# Patient Record
Sex: MALE | Race: WHITE | NOT HISPANIC OR LATINO | Employment: FULL TIME | URBAN - METROPOLITAN AREA
[De-identification: names, ages, dates, MRNs, and addresses within clinical notes are randomized per-mention and may not be internally consistent; named-entity substitution may affect disease eponyms.]

---

## 2020-08-15 ENCOUNTER — TRANSCRIBE ORDERS (OUTPATIENT)
Dept: LAB | Facility: HOSPITAL | Age: 55
End: 2020-08-15

## 2020-08-15 ENCOUNTER — APPOINTMENT (OUTPATIENT)
Dept: LAB | Facility: HOSPITAL | Age: 55
End: 2020-08-15
Payer: COMMERCIAL

## 2020-08-15 DIAGNOSIS — Z62.820 OTHER PARENT-CHILD PROBLEMS: ICD-10-CM

## 2020-08-15 DIAGNOSIS — E11.9 DIABETES MELLITUS WITHOUT COMPLICATION (HCC): ICD-10-CM

## 2020-08-15 DIAGNOSIS — I10 ESSENTIAL HYPERTENSION, MALIGNANT: ICD-10-CM

## 2020-08-15 DIAGNOSIS — E78.2 MIXED HYPERLIPIDEMIA: ICD-10-CM

## 2020-08-15 DIAGNOSIS — Z72.0 TOBACCO ABUSE: ICD-10-CM

## 2020-08-15 DIAGNOSIS — E66.9 LIFELONG OBESITY: ICD-10-CM

## 2020-08-15 DIAGNOSIS — Z71.89 OTHER PARENT-CHILD PROBLEMS: ICD-10-CM

## 2020-08-15 DIAGNOSIS — E66.9 LIFELONG OBESITY: Primary | ICD-10-CM

## 2020-08-15 LAB
ALBUMIN SERPL BCP-MCNC: 4.5 G/DL (ref 3.4–4.8)
ALP SERPL-CCNC: 55 U/L (ref 10–129)
ALT SERPL W P-5'-P-CCNC: 42 U/L (ref 5–63)
ANION GAP SERPL CALCULATED.3IONS-SCNC: 6 MMOL/L (ref 4–13)
AST SERPL W P-5'-P-CCNC: 20 U/L (ref 15–41)
BASOPHILS # BLD AUTO: 0.05 THOUSANDS/ΜL (ref 0–0.1)
BASOPHILS NFR BLD AUTO: 0 % (ref 0–1)
BILIRUB SERPL-MCNC: 0.51 MG/DL (ref 0.3–1.2)
BUN SERPL-MCNC: 14 MG/DL (ref 6–20)
CALCIUM SERPL-MCNC: 9.6 MG/DL (ref 8.4–10.2)
CHLORIDE SERPL-SCNC: 107 MMOL/L (ref 96–108)
CHOLEST SERPL-MCNC: 117 MG/DL
CO2 SERPL-SCNC: 27 MMOL/L (ref 22–33)
CREAT SERPL-MCNC: 0.94 MG/DL (ref 0.5–1.2)
EOSINOPHIL # BLD AUTO: 0.45 THOUSAND/ΜL (ref 0–0.61)
EOSINOPHIL NFR BLD AUTO: 4 % (ref 0–6)
ERYTHROCYTE [DISTWIDTH] IN BLOOD BY AUTOMATED COUNT: 12.9 % (ref 11.6–15.1)
EST. AVERAGE GLUCOSE BLD GHB EST-MCNC: 114 MG/DL
GFR SERPL CREATININE-BSD FRML MDRD: 92 ML/MIN/1.73SQ M
GLUCOSE P FAST SERPL-MCNC: 116 MG/DL (ref 70–100)
HBA1C MFR BLD: 5.6 %
HCT VFR BLD AUTO: 49.2 % (ref 36.5–49.3)
HDLC SERPL-MCNC: 30 MG/DL
HGB BLD-MCNC: 16.4 G/DL (ref 12–17)
IMM GRANULOCYTES # BLD AUTO: 0.04 THOUSAND/UL (ref 0–0.2)
IMM GRANULOCYTES NFR BLD AUTO: 0 % (ref 0–2)
LDLC SERPL CALC-MCNC: 68 MG/DL (ref 0–100)
LYMPHOCYTES # BLD AUTO: 2.64 THOUSANDS/ΜL (ref 0.6–4.47)
LYMPHOCYTES NFR BLD AUTO: 23 % (ref 14–44)
MCH RBC QN AUTO: 32.2 PG (ref 26.8–34.3)
MCHC RBC AUTO-ENTMCNC: 33.3 G/DL (ref 31.4–37.4)
MCV RBC AUTO: 97 FL (ref 82–98)
MONOCYTES # BLD AUTO: 0.61 THOUSAND/ΜL (ref 0.17–1.22)
MONOCYTES NFR BLD AUTO: 5 % (ref 4–12)
NEUTROPHILS # BLD AUTO: 7.83 THOUSANDS/ΜL (ref 1.85–7.62)
NEUTS SEG NFR BLD AUTO: 68 % (ref 43–75)
NONHDLC SERPL-MCNC: 87 MG/DL
PLATELET # BLD AUTO: 222 THOUSANDS/UL (ref 149–390)
PMV BLD AUTO: 10.6 FL (ref 8.9–12.7)
POTASSIUM SERPL-SCNC: 4.7 MMOL/L (ref 3.5–5)
PROT SERPL-MCNC: 6.9 G/DL (ref 6.4–8.3)
PSA SERPL-MCNC: 0.5 NG/ML (ref 0–4)
RBC # BLD AUTO: 5.1 MILLION/UL (ref 3.88–5.62)
SODIUM SERPL-SCNC: 140 MMOL/L (ref 133–145)
TRIGL SERPL-MCNC: 94.7 MG/DL
WBC # BLD AUTO: 11.62 THOUSAND/UL (ref 4.31–10.16)

## 2020-08-15 PROCEDURE — 36415 COLL VENOUS BLD VENIPUNCTURE: CPT

## 2020-08-15 PROCEDURE — G0103 PSA SCREENING: HCPCS

## 2020-08-15 PROCEDURE — 85025 COMPLETE CBC W/AUTO DIFF WBC: CPT

## 2020-08-15 PROCEDURE — 83036 HEMOGLOBIN GLYCOSYLATED A1C: CPT

## 2020-08-15 PROCEDURE — 80061 LIPID PANEL: CPT

## 2020-08-15 PROCEDURE — 80053 COMPREHEN METABOLIC PANEL: CPT

## 2020-08-17 ENCOUNTER — APPOINTMENT (OUTPATIENT)
Dept: LAB | Facility: HOSPITAL | Age: 55
End: 2020-08-17
Payer: COMMERCIAL

## 2020-08-17 ENCOUNTER — TRANSCRIBE ORDERS (OUTPATIENT)
Dept: ADMINISTRATIVE | Facility: HOSPITAL | Age: 55
End: 2020-08-17

## 2020-08-17 DIAGNOSIS — E11.9 DIABETES MELLITUS WITHOUT COMPLICATION (HCC): Primary | ICD-10-CM

## 2020-08-17 LAB
CREAT UR-MCNC: 267 MG/DL
MICROALBUMIN UR-MCNC: 10.6 MG/L (ref 0–20)
MICROALBUMIN/CREAT 24H UR: 4 MG/G CREATININE (ref 0–30)

## 2020-08-17 PROCEDURE — 82570 ASSAY OF URINE CREATININE: CPT

## 2020-08-17 PROCEDURE — 82043 UR ALBUMIN QUANTITATIVE: CPT

## 2021-01-10 ENCOUNTER — IMMUNIZATIONS (OUTPATIENT)
Dept: FAMILY MEDICINE CLINIC | Facility: HOSPITAL | Age: 56
End: 2021-01-10

## 2021-01-10 DIAGNOSIS — Z23 ENCOUNTER FOR IMMUNIZATION: ICD-10-CM

## 2021-01-10 PROCEDURE — 0001A SARS-COV-2 / COVID-19 MRNA VACCINE (PFIZER-BIONTECH) 30 MCG: CPT

## 2021-01-10 PROCEDURE — 91300 SARS-COV-2 / COVID-19 MRNA VACCINE (PFIZER-BIONTECH) 30 MCG: CPT

## 2021-01-25 ENCOUNTER — NURSE TRIAGE (OUTPATIENT)
Dept: OTHER | Facility: OTHER | Age: 56
End: 2021-01-25

## 2021-01-25 DIAGNOSIS — Z20.822 SUSPECTED SEVERE ACUTE RESPIRATORY SYNDROME CORONAVIRUS 2 (SARS-COV-2) INFECTION: ICD-10-CM

## 2021-01-25 DIAGNOSIS — Z20.822 SUSPECTED SEVERE ACUTE RESPIRATORY SYNDROME CORONAVIRUS 2 (SARS-COV-2) INFECTION: Primary | ICD-10-CM

## 2021-01-25 LAB — SARS-COV-2 RNA RESP QL NAA+PROBE: NEGATIVE

## 2021-01-25 PROCEDURE — U0003 INFECTIOUS AGENT DETECTION BY NUCLEIC ACID (DNA OR RNA); SEVERE ACUTE RESPIRATORY SYNDROME CORONAVIRUS 2 (SARS-COV-2) (CORONAVIRUS DISEASE [COVID-19]), AMPLIFIED PROBE TECHNIQUE, MAKING USE OF HIGH THROUGHPUT TECHNOLOGIES AS DESCRIBED BY CMS-2020-01-R: HCPCS | Performed by: FAMILY MEDICINE

## 2021-01-25 PROCEDURE — U0005 INFEC AGEN DETEC AMPLI PROBE: HCPCS | Performed by: FAMILY MEDICINE

## 2021-01-25 NOTE — TELEPHONE ENCOUNTER
Reason for Disposition   [1] COVID-19 infection suspected by caller or triager AND [2] mild symptoms (cough, fever, or others) AND [8] no complications or SOB    Protocols used: CORONAVIRUS (COVID-19) DIAGNOSED OR SUSPECTED-[facility]

## 2021-01-25 NOTE — TELEPHONE ENCOUNTER
1  Were you within 6 feet or less, for up to 15 minutes or more with a person that has a confirmed COVID-19 test?   Denied known exposure  He is a   2  What was the date of your exposure? N/A  3  Are you experiencing any symptoms attributed to the virus?  (Assess for SOB, cough, fever, difficulty breathing)  1/23/2021  Sore throat  Headache  Sinus congestion  Intermittent, productive cough  Sputum is white to yellow tinged  4  HIGH RISK: Do you have any history heart or lung conditions, weakened immune system, diabetes, Asthma, CHF, HIV, COPD, Chemo, renal failure, sickle cell, etc?   Type II Diabetes  Hypertension

## 2021-01-25 NOTE — TELEPHONE ENCOUNTER
Regarding: COVID- Symptomatic/ Cough  ----- Message from Dominique Mishra Dr sent at 1/25/2021  7:26 AM EST -----  "I would like a script to get tested for COVID-19   I have a cough and congestion "

## 2021-01-29 ENCOUNTER — TRANSCRIBE ORDERS (OUTPATIENT)
Dept: ADMINISTRATIVE | Facility: HOSPITAL | Age: 56
End: 2021-01-29

## 2021-01-29 ENCOUNTER — HOSPITAL ENCOUNTER (OUTPATIENT)
Dept: RADIOLOGY | Facility: HOSPITAL | Age: 56
Discharge: HOME | End: 2021-01-29
Attending: INTERNAL MEDICINE
Payer: COMMERCIAL

## 2021-01-29 DIAGNOSIS — J45.901 EXTRINSIC ASTHMA WITH ACUTE EXACERBATION, UNSPECIFIED ASTHMA SEVERITY, UNSPECIFIED WHETHER PERSISTENT: Primary | ICD-10-CM

## 2021-01-29 DIAGNOSIS — J45.901 EXTRINSIC ASTHMA WITH ACUTE EXACERBATION, UNSPECIFIED ASTHMA SEVERITY, UNSPECIFIED WHETHER PERSISTENT: ICD-10-CM

## 2021-01-29 PROCEDURE — 71046 X-RAY EXAM CHEST 2 VIEWS: CPT

## 2021-01-30 ENCOUNTER — IMMUNIZATIONS (OUTPATIENT)
Dept: FAMILY MEDICINE CLINIC | Facility: HOSPITAL | Age: 56
End: 2021-01-30

## 2021-01-30 DIAGNOSIS — Z23 ENCOUNTER FOR IMMUNIZATION: Primary | ICD-10-CM

## 2021-01-30 PROCEDURE — 91300 SARS-COV-2 / COVID-19 MRNA VACCINE (PFIZER-BIONTECH) 30 MCG: CPT

## 2021-01-30 PROCEDURE — 0002A SARS-COV-2 / COVID-19 MRNA VACCINE (PFIZER-BIONTECH) 30 MCG: CPT

## 2021-02-07 DIAGNOSIS — K21.00 GASTROESOPHAGEAL REFLUX DISEASE WITH ESOPHAGITIS WITHOUT HEMORRHAGE: Primary | ICD-10-CM

## 2021-02-08 RX ORDER — PANTOPRAZOLE SODIUM 20 MG/1
TABLET, DELAYED RELEASE ORAL
Qty: 30 TABLET | Refills: 1 | Status: SHIPPED | OUTPATIENT
Start: 2021-02-08 | End: 2021-02-15 | Stop reason: SDUPTHER

## 2021-02-15 DIAGNOSIS — K21.00 GASTROESOPHAGEAL REFLUX DISEASE WITH ESOPHAGITIS WITHOUT HEMORRHAGE: ICD-10-CM

## 2021-02-15 RX ORDER — PANTOPRAZOLE SODIUM 20 MG/1
TABLET, DELAYED RELEASE ORAL
Qty: 60 TABLET | Refills: 3 | Status: SHIPPED | OUTPATIENT
Start: 2021-02-15 | End: 2021-02-25

## 2021-02-18 NOTE — TELEPHONE ENCOUNTER
PT CALLED, INFORMED BY HIS [facility], PANTOPRAZOLE 20MG  BID NEEDS PRE-AUTHORIZATION   THANKS

## 2021-02-22 ENCOUNTER — TELEPHONE (OUTPATIENT)
Dept: GASTROENTEROLOGY | Facility: CLINIC | Age: 56
End: 2021-02-22

## 2021-02-25 DIAGNOSIS — K21.00 GASTROESOPHAGEAL REFLUX DISEASE WITH ESOPHAGITIS WITHOUT HEMORRHAGE: ICD-10-CM

## 2021-02-25 RX ORDER — PANTOPRAZOLE SODIUM 20 MG/1
TABLET, DELAYED RELEASE ORAL
Qty: 60 TABLET | Refills: 2 | Status: SHIPPED | OUTPATIENT
Start: 2021-02-25 | End: 2021-05-24

## 2021-03-10 ENCOUNTER — CONSULT (OUTPATIENT)
Dept: PULMONOLOGY | Facility: CLINIC | Age: 56
End: 2021-03-10
Payer: COMMERCIAL

## 2021-03-10 VITALS
SYSTOLIC BLOOD PRESSURE: 110 MMHG | BODY MASS INDEX: 38.36 KG/M2 | HEIGHT: 76 IN | HEART RATE: 104 BPM | TEMPERATURE: 98.1 F | WEIGHT: 315 LBS | DIASTOLIC BLOOD PRESSURE: 70 MMHG | RESPIRATION RATE: 18 BRPM

## 2021-03-10 DIAGNOSIS — Z87.891 PERSONAL HISTORY OF TOBACCO USE: ICD-10-CM

## 2021-03-10 DIAGNOSIS — J41.0 SIMPLE CHRONIC BRONCHITIS (HCC): ICD-10-CM

## 2021-03-10 DIAGNOSIS — J20.9 ACUTE BRONCHITIS: Primary | ICD-10-CM

## 2021-03-10 PROCEDURE — 99244 OFF/OP CNSLTJ NEW/EST MOD 40: CPT | Performed by: INTERNAL MEDICINE

## 2021-03-10 PROCEDURE — 3008F BODY MASS INDEX DOCD: CPT | Performed by: INTERNAL MEDICINE

## 2021-03-10 RX ORDER — ATORVASTATIN CALCIUM 40 MG/1
TABLET, FILM COATED ORAL
COMMUNITY
Start: 2020-10-05

## 2021-03-10 RX ORDER — VARENICLINE TARTRATE 25 MG
KIT ORAL
Qty: 53 TABLET | Refills: 0 | Status: SHIPPED | OUTPATIENT
Start: 2021-03-10 | End: 2021-06-11

## 2021-03-10 RX ORDER — LOSARTAN POTASSIUM 100 MG/1
100 TABLET ORAL DAILY
COMMUNITY
Start: 2021-02-28

## 2021-03-10 RX ORDER — NICOTINE 21 MG/24HR
1 PATCH, TRANSDERMAL 24 HOURS TRANSDERMAL EVERY 24 HOURS
Qty: 28 PATCH | Refills: 0 | Status: SHIPPED | OUTPATIENT
Start: 2021-03-10 | End: 2021-06-11

## 2021-03-10 RX ORDER — VARENICLINE TARTRATE 1 MG/1
1 TABLET, FILM COATED ORAL 2 TIMES DAILY
Qty: 60 TABLET | Refills: 2 | Status: SHIPPED | OUTPATIENT
Start: 2021-03-10 | End: 2021-06-11

## 2021-03-10 NOTE — ASSESSMENT & PLAN NOTE
Current smoker of 2 packs of cigarettes daily  Previously stop smoking for 8 months with the use of Chantix  Penis wife both desire to stop smoking  Based on his history of success with Chantix I reordered that drug along with nicotine patch which is now recommended by the [employer]  Follow-up in a month  Behavioral modification discussed

## 2021-03-10 NOTE — ASSESSMENT & PLAN NOTE
This patient has not in a baseline has chronic cough usually in the morning and the spine sputum production  Would be labeled as chronic bronchitis based on this history  For now there seems to be no convincing evidence of airflow obstruction the based on the symptoms and physical findings  PFT's planned  I suspect that the low lung volumes on chest x-ray was more related to his body weight than anything else

## 2021-03-10 NOTE — PROGRESS NOTES
Assessment/Plan:    Simple chronic bronchitis ([address] )  This patient has not in a baseline has chronic cough usually in the morning and the spine sputum production  Would be labeled as chronic bronchitis based on this history  For now there seems to be no convincing evidence of airflow obstruction the based on the symptoms and physical findings  PFT's planned  I suspect that the low lung volumes on chest x-ray was more related to his body weight than anything else  Personal history of tobacco use  Current smoker of 2 packs of cigarettes daily  Previously stop smoking for 8 months with the use of Chantix  Penis wife both desire to stop smoking  Based on his history of success with Chantix I reordered that drug along with nicotine patch which is now recommended by the [employer]  Follow-up in a month  Behavioral modification discussed  Correction of above  Third sentence above should read: He and his wife both desire to stop smoking  Sorry for the typo   Diagnoses and all orders for this visit:    Acute bronchitis  -     Complete PFT with post bronchodilator; Future    Personal history of tobacco use  -     varenicline (CHANTIX LIZETTE) 0 5 MG X 11 & 1 MG X 42 tablet; Take one 0 5 mg tablet by mouth once daily for 3 days, then one 0 5 mg tablet by mouth twice daily for 4 days, then one 1 mg tablet by mouth twice daily  -     varenicline (CHANTIX) 1 mg tablet; Take 1 tablet (1 mg total) by mouth 2 (two) times a day  -     nicotine (NICODERM CQ) 21 mg/24 hr TD 24 hr patch; Place 1 patch on the skin every 24 hours    Simple chronic bronchitis (HCC)    Other orders  -     atorvastatin (LIPITOR) 40 mg tablet  -     losartan (COZAAR) 100 MG tablet; Take 100 mg by mouth daily  -     metFORMIN (GLUCOPHAGE) 1000 MG tablet; Take 1,000 mg by mouth 2 (two) times a day  -     Semaglutide (OZEMPIC, 0 25 OR 0 5 MG/DOSE, SC)          Subjective:      Patient ID: Antionette Mays is a 50 y o  male      This patient recently had an episode of acute bronchitis  Reportedly he tested negative for COVID-19 at that time  He does recall being particularly short of breath  Did not have the taste or smell alteration  Not aware of fever  No GI symptoms  Now feels back to normal   There is question about underlying lung disease  Recent chest x-ray was clear except for low lung volumes probably reflecting his body size  Patient is a current smoker of 2 packs of cigarettes daily  He and his wife would like to stop smoking  Previously was able to stop smoking using Chantix for 8 months  Patient when well has a chronic cough especially in the morning productive of small amounts of white sputum  Not aware of significant dyspnea on exertion  His wife hears him wheeze at night  No nocturnal awakenings with the respiratory symptoms  No symptoms of sleep apnea  Patient works 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 work  The following portions of the patient's history were reviewed and updated as appropriate: allergies, current medications, past family history, past medical history, past social history, past surgical history and problem list     Review of Systems   Constitutional: Negative for activity change and unexpected weight change  HENT: Negative for congestion, sinus pressure and sneezing  Respiratory: Positive for cough and wheezing  Negative for apnea and shortness of breath  Cardiovascular: Negative for chest pain, palpitations and leg swelling  Gastrointestinal: Negative for abdominal pain  Genitourinary: Negative for difficulty urinating  Musculoskeletal: Negative for arthralgias and back pain  Allergic/Immunologic: Negative for environmental allergies  Neurological: Negative  Hematological: Negative for adenopathy           Objective:      /70 (BP Location: Left arm, Patient Position: Sitting, Cuff Size: Adult)   Pulse 104   Temp 98 1 °F (36 7 °C)   Resp 18   Ht 6' 4" (1 93 m)   Wt (!) 150 kg (330 lb)   BMI 40 17 kg/m²          Physical Exam  Vitals signs reviewed  Constitutional:       Appearance: He is obese  He is not ill-appearing  HENT:      Right Ear: Tympanic membrane normal       Left Ear: Tympanic membrane normal       Nose: Septal deviation (To the right) present  Mouth/Throat:      Mouth: Mucous membranes are moist       Pharynx: Oropharynx is clear  Eyes:      General: No scleral icterus  Conjunctiva/sclera: Conjunctivae normal    Neck:      Musculoskeletal: No neck rigidity or muscular tenderness  Vascular: No JVD  Cardiovascular:      Rate and Rhythm: Normal rate and regular rhythm  Pulses:           Radial pulses are 3+ on the right side and 3+ on the left side  Heart sounds: Normal heart sounds  Pulmonary:      Effort: Pulmonary effort is normal       Breath sounds: Normal breath sounds  No wheezing or rales  Abdominal:      General: Abdomen is flat  There is no distension  Palpations: Abdomen is soft  There is no hepatomegaly or splenomegaly  Musculoskeletal:         General: No swelling  Right lower leg: No edema  Left lower leg: No edema  Lymphadenopathy:      Cervical: No cervical adenopathy  Skin:     General: Skin is warm and dry  Findings: Erythema ( mild post phlebitic rash the right ankle) present  Neurological:      Mental Status: He is alert and oriented to person, place, and time     Psychiatric:         Mood and Affect: Mood normal          Behavior: Behavior normal

## 2021-03-19 ENCOUNTER — TELEPHONE (OUTPATIENT)
Dept: PULMONOLOGY | Facility: CLINIC | Age: 56
End: 2021-03-19

## 2021-03-19 NOTE — TELEPHONE ENCOUNTER
Called patient and LM to call the office so we can help him schedule his PFT that was ordered on 3/10/21

## 2021-04-13 ENCOUNTER — HOSPITAL ENCOUNTER (OUTPATIENT)
Dept: PULMONOLOGY | Facility: HOSPITAL | Age: 56
Discharge: HOME | End: 2021-04-13
Attending: INTERNAL MEDICINE
Payer: COMMERCIAL

## 2021-04-13 DIAGNOSIS — J20.9 ACUTE BRONCHITIS: ICD-10-CM

## 2021-04-13 PROCEDURE — 94060 EVALUATION OF WHEEZING: CPT

## 2021-04-13 PROCEDURE — 94060 EVALUATION OF WHEEZING: CPT | Performed by: INTERNAL MEDICINE

## 2021-04-13 PROCEDURE — 94726 PLETHYSMOGRAPHY LUNG VOLUMES: CPT

## 2021-04-13 PROCEDURE — 94726 PLETHYSMOGRAPHY LUNG VOLUMES: CPT | Performed by: INTERNAL MEDICINE

## 2021-04-13 PROCEDURE — 94729 DIFFUSING CAPACITY: CPT | Performed by: INTERNAL MEDICINE

## 2021-04-13 PROCEDURE — 94760 N-INVAS EAR/PLS OXIMETRY 1: CPT

## 2021-04-13 PROCEDURE — 94200 LUNG FUNCTION TEST (MBC/MVV): CPT

## 2021-04-13 PROCEDURE — 94729 DIFFUSING CAPACITY: CPT

## 2021-04-13 RX ORDER — ALBUTEROL SULFATE 2.5 MG/3ML
2.5 SOLUTION RESPIRATORY (INHALATION) ONCE
Status: COMPLETED | OUTPATIENT
Start: 2021-04-13 | End: 2021-04-13

## 2021-04-13 RX ADMIN — ALBUTEROL SULFATE 2.5 MG: 2.5 SOLUTION RESPIRATORY (INHALATION) at 15:11

## 2021-05-04 ENCOUNTER — OFFICE VISIT (OUTPATIENT)
Dept: GASTROENTEROLOGY | Facility: CLINIC | Age: 56
End: 2021-05-04
Payer: COMMERCIAL

## 2021-05-04 VITALS
DIASTOLIC BLOOD PRESSURE: 85 MMHG | SYSTOLIC BLOOD PRESSURE: 138 MMHG | BODY MASS INDEX: 38.36 KG/M2 | HEIGHT: 76 IN | WEIGHT: 315 LBS | HEART RATE: 83 BPM

## 2021-05-04 DIAGNOSIS — K22.81 ESOPHAGEAL POLYP: ICD-10-CM

## 2021-05-04 DIAGNOSIS — E66.3 OVERWEIGHT: ICD-10-CM

## 2021-05-04 DIAGNOSIS — K22.70 BARRETT'S ESOPHAGUS WITHOUT DYSPLASIA: ICD-10-CM

## 2021-05-04 DIAGNOSIS — Z86.010 HX OF ADENOMATOUS COLONIC POLYPS: ICD-10-CM

## 2021-05-04 DIAGNOSIS — Q43.8 REDUNDANT COLON: ICD-10-CM

## 2021-05-04 DIAGNOSIS — K21.00 GASTROESOPHAGEAL REFLUX DISEASE WITH ESOPHAGITIS WITHOUT HEMORRHAGE: Primary | ICD-10-CM

## 2021-05-04 PROBLEM — Z86.0101 HX OF ADENOMATOUS COLONIC POLYPS: Status: ACTIVE | Noted: 2021-05-04

## 2021-05-04 PROCEDURE — 99214 OFFICE O/P EST MOD 30 MIN: CPT | Performed by: INTERNAL MEDICINE

## 2021-05-04 PROCEDURE — 3008F BODY MASS INDEX DOCD: CPT | Performed by: INTERNAL MEDICINE

## 2021-05-04 NOTE — PROGRESS NOTES
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    As above  - EGD; Future  - PAT Covid Screening; Future    4  Hx of adenomatous colonic polyps   colon cancer screening is up-to-date  Next colonoscopy December 2022     5  Redundant colon   adequately moving his bowels  6  Overweight    Discussed diet weight loss exercise  He is eating salads each day  He will otherwise follow-up with me in 6 months     ______________________________________________________________________    SUBJECTIVE:   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  He has Maciel's esophagus and reflux which are well controlled with just 20 mg of Protonix a day  When he runs out he has difficulties  He likewise has a history of a squamous papilloma of the esophagus and is due for surveillance  We discussed doing his Maciel's  Surveillance at the same time  He does have a history of a redundant colon and tubular adenomas of the colon is next colonoscopy is due 1222  He is working on portion control weight loss and exercise  REVIEW OF SYSTEMS IS OTHERWISE NEGATIVE  Historical Information   History reviewed  No pertinent past medical history  History reviewed  No pertinent surgical history    Social History   Social History     Substance and Sexual Activity   Alcohol Use Yes    Frequency: Monthly or less    Drinks per session: 1 or 2    Binge frequency: Never     Social History     Substance and Sexual Activity   Drug Use Never     Social History     Tobacco Use   Smoking Status Current Every Day Smoker    Packs/day: 2 00    Years: 36 00    Pack years: 72 00    Types: Cigarettes    Start date: 3/10/1984   Smokeless Tobacco Never Used     History reviewed  No pertinent family history  Meds/Allergies       Current Outpatient Medications:     atorvastatin (LIPITOR) 40 mg tablet    losartan (COZAAR) 100 MG tablet    pantoprazole (PROTONIX) 20 mg tablet    Semaglutide (OZEMPIC, 0 25 OR 0 5 MG/DOSE, SC)    metFORMIN (GLUCOPHAGE) 1000 MG tablet    nicotine (NICODERM CQ) 21 mg/24 hr TD 24 hr patch    varenicline (CHANTIX LIZETTE) 0 5 MG X 11 & 1 MG X 42 tablet    varenicline (CHANTIX) 1 mg tablet    No Known Allergies        Objective     Blood pressure 138/85, pulse 83, height 6' 4" (1 93 m), weight (!) 147 kg (324 lb)  Body mass index is 39 44 kg/m²  PHYSICAL EXAM:      General Appearance:   Alert, cooperative, no distress   HEENT:   Normocephalic, atraumatic, anicteric      Neck:  Supple, symmetrical, trachea midline   Lungs:   Clear to auscultation bilaterally; no rales, rhonchi or wheezing; respirations unlabored    Heart[de-identified]   Regular rate and rhythm; no murmur, rub, or gallop  Abdomen:   Soft, non-tender, non-distended; normal bowel sounds; no masses, no organomegaly    Genitalia:   Deferred    Rectal:   Deferred    Extremities:  No cyanosis, clubbing or edema    Pulses:  2+ and symmetric    Skin:  No jaundice, rashes, or lesions    Lymph nodes:  No palpable cervical lymphadenopathy        Lab Results:   No visits with results within 1 Day(s) from this visit  Latest known visit with results is:   Orders Only on 01/25/2021   Component Date Value    SARS-CoV-2 01/25/2021 Negative          Radiology Results:   No results found

## 2021-05-22 DIAGNOSIS — K21.00 GASTROESOPHAGEAL REFLUX DISEASE WITH ESOPHAGITIS WITHOUT HEMORRHAGE: ICD-10-CM

## 2021-05-24 RX ORDER — PANTOPRAZOLE SODIUM 20 MG/1
TABLET, DELAYED RELEASE ORAL
Qty: 60 TABLET | Refills: 2 | Status: SHIPPED | OUTPATIENT
Start: 2021-05-24 | End: 2021-08-26

## 2021-06-07 ENCOUNTER — TELEPHONE (OUTPATIENT)
Dept: GASTROENTEROLOGY | Facility: CLINIC | Age: 56
End: 2021-06-07

## 2021-06-09 LAB — EXT SARS-COV-2: NOT DETECTED

## 2021-06-10 RX ORDER — SEMAGLUTIDE 1.34 MG/ML
INJECTION, SOLUTION SUBCUTANEOUS
COMMUNITY
Start: 2021-05-19

## 2021-06-10 RX ORDER — FLUTICASONE FUROATE AND VILANTEROL 200; 25 UG/1; UG/1
POWDER RESPIRATORY (INHALATION)
COMMUNITY
Start: 2021-05-17

## 2021-06-10 RX ORDER — HYDROCHLOROTHIAZIDE 25 MG/1
25 TABLET ORAL DAILY
COMMUNITY
Start: 2021-04-24

## 2021-06-10 RX ORDER — MULTIVIT-MIN/IRON FUM/FOLIC AC 7.5 MG-4
1 TABLET ORAL DAILY
COMMUNITY

## 2021-06-11 ENCOUNTER — ANESTHESIA EVENT (OUTPATIENT)
Dept: GASTROENTEROLOGY | Facility: AMBULATORY SURGERY CENTER | Age: 56
End: 2021-06-11

## 2021-06-11 ENCOUNTER — ANESTHESIA (OUTPATIENT)
Dept: GASTROENTEROLOGY | Facility: AMBULATORY SURGERY CENTER | Age: 56
End: 2021-06-11

## 2021-06-11 ENCOUNTER — HOSPITAL ENCOUNTER (OUTPATIENT)
Dept: GASTROENTEROLOGY | Facility: AMBULATORY SURGERY CENTER | Age: 56
Discharge: HOME | End: 2021-06-11
Payer: COMMERCIAL

## 2021-06-11 VITALS
OXYGEN SATURATION: 96 % | TEMPERATURE: 96.6 F | DIASTOLIC BLOOD PRESSURE: 75 MMHG | HEART RATE: 71 BPM | WEIGHT: 315 LBS | HEIGHT: 76 IN | RESPIRATION RATE: 18 BRPM | SYSTOLIC BLOOD PRESSURE: 129 MMHG | BODY MASS INDEX: 38.36 KG/M2

## 2021-06-11 DIAGNOSIS — K22.81 ESOPHAGEAL POLYP: ICD-10-CM

## 2021-06-11 DIAGNOSIS — K22.70 BARRETT'S ESOPHAGUS WITHOUT DYSPLASIA: ICD-10-CM

## 2021-06-11 DIAGNOSIS — K20.90 ESOPHAGITIS: Primary | ICD-10-CM

## 2021-06-11 DIAGNOSIS — K21.00 GASTROESOPHAGEAL REFLUX DISEASE WITH ESOPHAGITIS WITHOUT HEMORRHAGE: ICD-10-CM

## 2021-06-11 PROCEDURE — 88305 TISSUE EXAM BY PATHOLOGIST: CPT | Performed by: PATHOLOGY

## 2021-06-11 PROCEDURE — 00731 ANES UPR GI NDSC PX NOS: CPT | Performed by: NURSE ANESTHETIST, CERTIFIED REGISTERED

## 2021-06-11 PROCEDURE — 43239 EGD BIOPSY SINGLE/MULTIPLE: CPT | Performed by: INTERNAL MEDICINE

## 2021-06-11 RX ORDER — PROPOFOL 10 MG/ML
INJECTION, EMULSION INTRAVENOUS AS NEEDED
Status: DISCONTINUED | OUTPATIENT
Start: 2021-06-11 | End: 2021-06-11

## 2021-06-11 RX ORDER — SODIUM CHLORIDE 9 MG/ML
20 INJECTION, SOLUTION INTRAVENOUS CONTINUOUS
Status: DISCONTINUED | OUTPATIENT
Start: 2021-06-11 | End: 2021-06-15 | Stop reason: HOSPADM

## 2021-06-11 RX ORDER — SODIUM CHLORIDE 9 MG/ML
30 INJECTION, SOLUTION INTRAVENOUS CONTINUOUS
Status: DISCONTINUED | OUTPATIENT
Start: 2021-06-11 | End: 2021-06-15 | Stop reason: HOSPADM

## 2021-06-11 RX ADMIN — PROPOFOL 100 MG: 10 INJECTION, EMULSION INTRAVENOUS at 09:25

## 2021-06-11 RX ADMIN — PROPOFOL 100 MG: 10 INJECTION, EMULSION INTRAVENOUS at 09:27

## 2021-06-11 RX ADMIN — PROPOFOL 100 MG: 10 INJECTION, EMULSION INTRAVENOUS at 09:26

## 2021-06-11 RX ADMIN — SODIUM CHLORIDE: 9 INJECTION, SOLUTION INTRAVENOUS at 09:19

## 2021-06-11 RX ADMIN — PROPOFOL 100 MG: 10 INJECTION, EMULSION INTRAVENOUS at 09:30

## 2021-06-11 NOTE — DISCHARGE INSTRUCTIONS
Upper Endoscopy   WHAT YOU NEED TO KNOW:   An upper endoscopy is also called an upper gastrointestinal (GI) endoscopy, or an esophagogastroduodenoscopy (EGD)  It is a procedure to examine the inside of your esophagus, stomach, and duodenum (first part of the small intestine) with a scope  You may feel bloated, gassy, or have some abdominal discomfort after your procedure  Your throat may be sore for 24 to 36 hours  You may burp or pass gas from air that is still inside your body  DISCHARGE INSTRUCTIONS:   Seek care immediately if:    You have sudden, severe abdominal pain   You have problems swallowing   You have a large amount of black, sticky bowel movements or blood in your bowel movements   You have sudden trouble breathing   You feel weak, lightheaded, or faint or your heart beats faster than normal for you  Contact your healthcare provider if:    You have a fever and chills   You have nausea or are vomiting   Your abdomen is bloated or feels full and hard   You have abdominal pain   You have black, sticky bowel movements or blood in your bowel movements   You have not had a bowel movement for 3 days after your procedure   You have rash or hives   You have questions or concerns about your procedure  Activity:    Do not lift, strain, or run for 24 hours after your procedure   Rest after your procedure  You have been given medicine to relax you  Do not drive or make important decisions until the day after your procedure  Return to your normal activity as directed   Relieve gas and discomfort from bloating by lying on your right side with a heating pad on your abdomen  You may need to take short walks to help the gas move out  Eat small meals until bloating is relieved  Follow up with your healthcare provider as directed: Write down your questions so you remember to ask them during your visits       If you take a blood thinner, please review the specific instructions from your endoscopist about when you should resume it  These can be found in the Recommendation and Your Medication list sections of this After Visit Summary  Gastroesophageal Reflux Disease   WHAT YOU NEED TO KNOW:   What is gastroesophageal reflux disease (GERD)? GERD is reflux that occurs more than twice a week for a few weeks  Reflux means acid and food in the stomach back up into the esophagus  It usually causes heartburn and other symptoms  GERD can cause other health problems over time if it is not treated  What causes GERD? GERD often occurs when the lower muscle (sphincter) of the esophagus does not close properly  The sphincter normally opens to let food into the stomach  It then closes to keep food and stomach acid in the stomach  If the sphincter does not close properly, stomach acid and food back up (reflux) into the esophagus  The following may increase your risk for GERD:  · Certain foods such as spicy foods, chocolate, foods that contain caffeine, peppermint, and fried foods    · Hiatal hernia    · Certain medicines such as calcium channel blockers (used to treat high blood pressure), allergy medicines, sedatives, or antidepressants    · Pregnancy or obesity    · Lying down after a meal    · Drinking alcohol or smoking    What are the signs and symptoms of GERD? · Heartburn (burning pain in your chest)    · Pain after meals that spreads to your neck, jaw, or shoulder    · Pain that gets better when you change positions    · Bitter or acid taste in your mouth    · A dry cough    · Trouble swallowing or pain with swallowing    · Hoarseness or a sore throat    · Burping or hiccups    · Feeling full soon after you start eating    How is GERD diagnosed? Your healthcare provider will ask about your symptoms and when they started  Tell him or her about other medical conditions you have, your eating habits, and your activities   You may also need any of the following:  · The amount of acid  in your esophagus and stomach may be checked  A small probe is used to check the amount  · An endoscopy  is a procedure used to look at the inside of your esophagus and stomach  An endoscope is a bendable tube with a light and camera on the end  Your healthcare provider may remove a small sample of tissue and send it to a lab for tests  · X-ray  pictures may be taken of your stomach and intestines (bowel)  You may be given a chalky liquid to drink before the pictures are taken  This liquid helps your stomach and intestines show up better on the x-rays  · Pressure and function  tests of your esophagus can help find problems such as a hiatal hernia  How is GERD treated? · Medicines  are used to decrease stomach acid  Medicine may also be used to help your lower esophageal sphincter and stomach contract (tighten) more  · Surgery  is done to wrap the upper part of the stomach around the esophageal sphincter  This will strengthen the sphincter and prevent reflux  How can I manage GERD? · Do not have foods or drinks that may increase heartburn  These include chocolate, peppermint, fried or fatty foods, drinks that contain caffeine, or carbonated drinks (soda)  Other foods include spicy foods, onions, tomatoes, and tomato-based foods  Do not have foods or drinks that can irritate your esophagus, such as citrus fruits, juices, and alcohol  · Do not eat large meals  When you eat a lot of food at one time, your stomach needs more acid to digest it  Eat 6 small meals each day instead of 3 large ones, and eat slowly  Do not eat meals 2 to 3 hours before bedtime  · Elevate the head of your bed  Place 6-inch blocks under the head of your bed frame  You may also use more than one pillow under your head and shoulders while you sleep  · Maintain a healthy weight  If you are overweight, weight loss may help relieve symptoms of GERD  · Do not smoke    Smoking weakens the lower esophageal sphincter and increases the risk of GERD  Ask your healthcare provider for information if you currently smoke and need help to quit  E-cigarettes or smokeless tobacco still contain nicotine  Talk to your healthcare provider before you use these products  · Do not wear clothing that is tight around your waist   Tight clothing can put pressure on your stomach and cause or worsen GERD symptoms  Call your local emergency number (911 in the [address]) if:   · You have severe chest pain and sudden trouble breathing  When should I seek immediate care? · You have trouble breathing after you vomit  · You have trouble swallowing, or pain with swallowing  · Your bowel movements are black, bloody, or tarry-looking  · Your vomit looks like coffee grounds or has blood in it  When should I call my doctor? · You feel full and cannot burp or vomit  · You vomit large amounts, or you vomit often  · You are losing weight without trying  · Your symptoms get worse or do not improve with treatment  · You have questions or concerns about your condition or care  CARE AGREEMENT:   You have the right to help plan your care  Learn about your health condition and how it may be treated  Discuss treatment options with your healthcare providers to decide what care you want to receive  You always have the right to refuse treatment  The above information is an  only  It is not intended as medical advice for individual conditions or treatments  Talk to your doctor, nurse or pharmacist before following any medical regimen to see if it is safe and effective for you  © Copyright [address] Information is for End User's use only and may not be sold, redistributed or otherwise used for commercial purposes   All illustrations and images included in CareNotes® are the copyrighted property of A D A M [employer] Inc  or [employer]  Gastric Polyps   WHAT YOU NEED TO KNOW: What are gastric polyps? Gastric polyps are growths that form in the lining of your stomach  They are not cancerous, but certain types of polyps can change into cancer  What puts me at risk for gastric polyps? · Chronic gastritis caused by NSAIDs use or ulcers    · Long-term use of proton pump inhibitor medicines (used to decrease stomach acid)    · An infection in your stomach caused by H  pylori bacteria    What are the symptoms of gastric polyps? You may have no symptoms  Large polyps may cause any of the following:  · Abdominal pain    · Indigestion    · Vomiting after meals or vomiting blood    · Dark or bloody bowel movements    How are gastric polyps diagnosed? Gastric polyps are usually found during an endoscopy for another reason  All or part of the polyp will be removed during the test  Your healthcare provider may also remove tissue from your stomach  The polyps and tissue are sent to the lab for testing  How are gastric polyps treated? Some types of polyps go away on their own  Other types may be removed if they are large, you have symptoms, or abnormal cells are found  Large polyps and abnormal cells increase your risk for cancer  You may also need antibiotics if you have an infection caused by H  pylori bacteria  Part of your stomach may be removed if the polyps cannot be removed and abnormal cells are found  When should I seek immediate care? · You have blood in your vomit  · You have dark or bloody bowel movements  · You have severe pain in your abdomen that does not go away after you take medicine  When should I contact my healthcare provider? · You have indigestion that does not go away with treatment  · You vomit after meals  · You have questions or concerns about your condition or care  CARE AGREEMENT:   You have the right to help plan your care  Learn about your health condition and how it may be treated   Discuss treatment options with your healthcare providers to decide what care you want to receive  You always have the right to refuse treatment  The above information is an  only  It is not intended as medical advice for individual conditions or treatments  Talk to your doctor, nurse or pharmacist before following any medical regimen to see if it is safe and effective for you  © Copyright [address] Information is for End User's use only and may not be sold, redistributed or otherwise used for commercial purposes  All illustrations and images included in CareNotes® are the copyrighted property of [employer]  or [facility] Yoan Buck   Gastritis   WHAT YOU NEED TO KNOW:   What is gastritis? Gastritis is inflammation or irritation of the lining of your stomach  What increases my risk for gastritis? · Infection with bacteria, a virus, or a parasite    · NSAIDs, aspirin, or steroid medicine    · Use of tobacco products or alcohol    · Trauma such as an injury to your stomach or intestine    · Autoimmune disorders such as diabetes, thyroid disease, or Crohn disease    · Stress    · Age older than 60 years    · Illegal drugs, such as cocaine    What are the signs and symptoms of gastritis? · Stomach pain, burning, or tenderness when you press on it    · Stomach fullness or tightness    · Nausea or vomiting    · Loss of appetite, or feeling full quickly when you eat    · Bad breath    · Fatigue or feeling more tired than usual    · Heartburn    How is gastritis diagnosed? Your healthcare provider will ask about your signs and symptoms and examine you  You may need any of the following:  · Blood tests  may be used to show an infection, dehydration, or anemia (low red blood cell levels)  · A bowel movement sample  may be tested for blood or the germ that may be causing your gastritis  · A breath test  may show if H pylori is causing your gastritis  You will be given a liquid to drink  Then you will breathe into a bag   Your healthcare provider will measure the amount of carbon dioxide in your breath  Extra amounts of carbon dioxide may mean you have an H pylori infection  · An endoscopy  may be used to look for irritation or bleeding in your stomach  Your healthcare provider will use an endoscope (tube with a light and camera on the end) during the procedure  He or she may take a sample from your stomach to be tested  How is gastritis treated? Your symptoms may go away without treatment  Treatment will depend on what is causing your gastritis  Your healthcare provider may recommend changes to the medicines you take  Medicines may be given to help treat a bacterial infection or decrease stomach acid  How can I manage or prevent gastritis? · Do not smoke  Nicotine and other chemicals in cigarettes and cigars can make your symptoms worse and cause lung damage  Ask your healthcare provider for information if you currently smoke and need help to quit  E-cigarettes or smokeless tobacco still contain nicotine  Talk to your healthcare provider before you use these products  · Do not drink alcohol  Alcohol can prevent healing and make your gastritis worse  Talk to your healthcare provider if you need help to stop drinking  · Do not take NSAIDs or aspirin unless directed  These and similar medicines can cause irritation of your stomach lining  If your healthcare provider says it is okay to take NSAIDs, take them with food  · Do not eat foods that cause irritation  Foods such as oranges and salsa can cause burning or pain  Eat a variety of healthy foods  Examples include fruits (not citrus), vegetables, low-fat dairy products, beans, whole-grain breads, and lean meats and fish  Try to eat small meals, and drink water with your meals  Do not eat for at least 3 hours before you go to bed  · Find ways to relax and decrease stress  Stress can increase stomach acid and make gastritis worse   Activities such as yoga, meditation, or listening to music can help you relax  Spend time with friends, or do things you enjoy  Call 911 for any of the following:   · You develop chest pain or shortness of breath  When should I seek immediate care? · You vomit blood  · You have black or bloody bowel movements  · You have severe stomach or back pain  When should I contact my healthcare provider? · You have a fever  · You have new or worsening symptoms, even after treatment  · You have questions or concerns about your condition or care  CARE AGREEMENT:   You have the right to help plan your care  Learn about your health condition and how it may be treated  Discuss treatment options with your healthcare providers to decide what care you want to receive  You always have the right to refuse treatment  The above information is an  only  It is not intended as medical advice for individual conditions or treatments  Talk to your doctor, nurse or pharmacist before following any medical regimen to see if it is safe and effective for you  © Copyright [address] Information is for End User's use only and may not be sold, redistributed or otherwise used for commercial purposes   All illustrations and images included in CareNotes® are the copyrighted property of A D A M , Inc  or [address] [place] [address] [employer] [facility]

## 2021-06-11 NOTE — H&P
History and Physical - [facility]  Stephen Tanner 49 y o  male MRN: [id number]                  HPI: Stephen Tanner is a 49y o  year old male who presents for upper endoscopy history of esophageal polyps and Maciel's esophagus      REVIEW OF SYSTEMS: Per the HPI, and otherwise unremarkable  Historical Information   Past Medical History:   Diagnosis Date    Maciel's esophagus     Colon polyp     Cough 06/11/2021    had PFT due to 30 year plus smoker - uses Breo Inhaler for maintenance    Diabetes mellitus ([facility] [address] )     type 2, accu check 113 at 0700 on 6/11/21    Esophageal polyp 06/11/2021    GERD (gastroesophageal reflux disease)     Hypertension      Past Surgical History:   Procedure Laterality Date    COLONOSCOPY      UPPER GASTROINTESTINAL ENDOSCOPY       Social History   Social History     Substance and Sexual Activity   Alcohol Use Yes    Frequency: Monthly or less    Drinks per session: 1 or 2    Binge frequency: Never     Social History     Substance and Sexual Activity   Drug Use Never     Social History     Tobacco Use   Smoking Status Current Every Day Smoker    Packs/day: 2 00    Years: 36 00    Pack years: 72 00    Types: Cigarettes    Start date: 3/10/1984   Smokeless Tobacco Never Used     History reviewed  No pertinent family history      Meds/Allergies       Current Outpatient Medications:     atorvastatin (LIPITOR) 40 mg tablet    fluticasone-vilanterol (BREO ELLIPTA) 200-25 MCG/INH inhaler    hydrochlorothiazide (HYDRODIURIL) 25 mg tablet    losartan (COZAAR) 100 MG tablet    metFORMIN (GLUCOPHAGE) 1000 MG tablet    Multiple Vitamins-Minerals (multivitamin with minerals) tablet    Ozempic, 0 25 or 0 5 MG/DOSE, 2 MG/1 5ML SOPN    pantoprazole (PROTONIX) 20 mg tablet    Semaglutide (OZEMPIC, 0 25 OR 0 5 MG/DOSE, SC)    nicotine (NICODERM CQ) 21 mg/24 hr TD 24 hr patch    varenicline (CHANTIX LIZETTE) 0 5 MG X 11 & 1 MG X 42 tablet    varenicline (CHANTIX) 1 mg tablet    Current Facility-Administered Medications:     sodium chloride 0 9 % infusion, 20 mL/hr, Intravenous, Continuous    sodium chloride 0 9 % infusion, 30 mL/hr, Intravenous, Continuous    No Known Allergies    Objective     /87   Pulse 78   Temp (!) 96 6 °F (35 9 °C) (Temporal)   Resp 18   Ht 6' 4" (1 93 m)   Wt (!) 147 kg (323 lb)   SpO2 96%   BMI 39 32 kg/m²       PHYSICAL EXAM    Gen: NAD  Head: NCAT  CV: RRR  CHEST: Clear  ABD: soft, NT/ND  EXT: no edema      ASSESSMENT/PLAN:  This is a 49y o  year old male here for upper endoscopy, and he is stable and optimized for his procedure

## 2021-06-11 NOTE — ANESTHESIA POSTPROCEDURE EVALUATION
Post-Op Assessment Note    CV Status:  Stable  Pain Score: 0    Pain management: adequate     Mental Status:  Sleepy   PONV Controlled:  Controlled   Airway Patency:  Patent      Post Op Vitals Reviewed: Yes      Staff: CRNA         No complications documented      BP      Temp     Pulse     Resp      SpO2

## 2021-06-11 NOTE — ANESTHESIA PREPROCEDURE EVALUATION
Procedure:  EGD    Relevant Problems   GI/HEPATIC   (+) Gastroesophageal reflux disease with esophagitis without hemorrhage      NEURO/PSYCH   (+) Hx of adenomatous colonic polyps      PULMONARY   (+) Simple chronic bronchitis (HCC)        Physical Exam    Airway    Mallampati score: II  TM Distance: >3 FB  Neck ROM: full     Dental       Cardiovascular  Rhythm: regular, Rate: normal,     Pulmonary      Other Findings        Anesthesia Plan  ASA Score- 2     Anesthesia Type- IV sedation with anesthesia with ASA Monitors  Additional Monitors:   Airway Plan:           Plan Factors-Exercise tolerance (METS): >4 METS  Induction- intravenous  Postoperative Plan-     Informed Consent- Anesthetic plan and risks discussed with patient  I personally reviewed this patient with the CRNA  Discussed and agreed on the Anesthesia Plan with the CRNA  Derek Jacobo

## 2021-08-26 DIAGNOSIS — K21.00 GASTROESOPHAGEAL REFLUX DISEASE WITH ESOPHAGITIS WITHOUT HEMORRHAGE: ICD-10-CM

## 2021-08-26 RX ORDER — PANTOPRAZOLE SODIUM 20 MG/1
TABLET, DELAYED RELEASE ORAL
Qty: 60 TABLET | Refills: 2 | Status: SHIPPED | OUTPATIENT
Start: 2021-08-26 | End: 2021-11-01

## 2021-09-23 ENCOUNTER — HOSPITAL ENCOUNTER (OUTPATIENT)
Dept: CT IMAGING | Facility: HOSPITAL | Age: 56
Discharge: HOME | End: 2021-09-23
Attending: INTERNAL MEDICINE
Payer: COMMERCIAL

## 2021-09-23 DIAGNOSIS — Z72.0 TOBACCO ABUSE: ICD-10-CM

## 2021-09-23 PROCEDURE — 71271 CT THORAX LUNG CANCER SCR C-: CPT

## 2021-09-25 ENCOUNTER — IMMUNIZATIONS (OUTPATIENT)
Dept: FAMILY MEDICINE CLINIC | Facility: HOSPITAL | Age: 56
End: 2021-09-25

## 2021-09-25 DIAGNOSIS — Z23 ENCOUNTER FOR IMMUNIZATION: Primary | ICD-10-CM

## 2021-09-25 PROCEDURE — 0001A SARS-COV-2 / COVID-19 MRNA VACCINE (PFIZER-BIONTECH) 30 MCG: CPT

## 2021-09-25 PROCEDURE — 91300 SARS-COV-2 / COVID-19 MRNA VACCINE (PFIZER-BIONTECH) 30 MCG: CPT

## 2021-10-29 DIAGNOSIS — K21.00 GASTROESOPHAGEAL REFLUX DISEASE WITH ESOPHAGITIS WITHOUT HEMORRHAGE: ICD-10-CM

## 2021-11-01 RX ORDER — PANTOPRAZOLE SODIUM 20 MG/1
TABLET, DELAYED RELEASE ORAL
Qty: 60 TABLET | Refills: 1 | Status: SHIPPED | OUTPATIENT
Start: 2021-11-01 | End: 2022-01-26

## 2022-01-24 ENCOUNTER — APPOINTMENT (OUTPATIENT)
Dept: LAB | Facility: HOSPITAL | Age: 57
End: 2022-01-24
Attending: INTERNAL MEDICINE
Payer: COMMERCIAL

## 2022-01-24 DIAGNOSIS — I10 HYPERTENSION, ESSENTIAL: ICD-10-CM

## 2022-01-24 DIAGNOSIS — Z72.0 TOBACCO ABUSE: ICD-10-CM

## 2022-01-24 DIAGNOSIS — D35.00 BENIGN NEOPLASM OF ADRENAL GLAND, UNSPECIFIED LATERALITY: ICD-10-CM

## 2022-01-24 DIAGNOSIS — E11.00 TYPE II DIABETES MELLITUS WITH HYPEROSMOLARITY, UNCONTROLLED (HCC): ICD-10-CM

## 2022-01-24 DIAGNOSIS — E78.49 FAMILIAL COMBINED HYPERLIPIDEMIA: ICD-10-CM

## 2022-01-24 DIAGNOSIS — E11.65 TYPE II DIABETES MELLITUS WITH HYPEROSMOLARITY, UNCONTROLLED (HCC): ICD-10-CM

## 2022-01-24 LAB
ALBUMIN SERPL BCP-MCNC: 4.6 G/DL (ref 3.4–4.8)
ALP SERPL-CCNC: 67.5 U/L (ref 10–129)
ALT SERPL W P-5'-P-CCNC: 34 U/L (ref 5–63)
ANION GAP SERPL CALCULATED.3IONS-SCNC: 9 MMOL/L (ref 4–13)
AST SERPL W P-5'-P-CCNC: 23 U/L (ref 15–41)
BASOPHILS # BLD AUTO: 0.12 THOUSANDS/ΜL (ref 0–0.1)
BASOPHILS NFR BLD AUTO: 1 % (ref 0–1)
BILIRUB SERPL-MCNC: 0.57 MG/DL (ref 0.3–1.2)
BUN SERPL-MCNC: 12 MG/DL (ref 6–20)
CALCIUM SERPL-MCNC: 9.6 MG/DL (ref 8.4–10.2)
CHLORIDE SERPL-SCNC: 103 MMOL/L (ref 96–108)
CHOLEST SERPL-MCNC: 136 MG/DL
CO2 SERPL-SCNC: 28 MMOL/L (ref 22–33)
CREAT SERPL-MCNC: 1.26 MG/DL (ref 0.5–1.2)
EOSINOPHIL # BLD AUTO: 1.27 THOUSAND/ΜL (ref 0–0.61)
EOSINOPHIL NFR BLD AUTO: 14 % (ref 0–6)
ERYTHROCYTE [DISTWIDTH] IN BLOOD BY AUTOMATED COUNT: 12.6 % (ref 11.6–15.1)
EST. AVERAGE GLUCOSE BLD GHB EST-MCNC: 123 MG/DL
GFR SERPL CREATININE-BSD FRML MDRD: 64 ML/MIN/1.73SQ M
GLUCOSE P FAST SERPL-MCNC: 134 MG/DL (ref 70–105)
HBA1C MFR BLD: 5.9 %
HCT VFR BLD AUTO: 49.1 % (ref 36.5–49.3)
HDLC SERPL-MCNC: 36 MG/DL
HGB BLD-MCNC: 16.9 G/DL (ref 12–17)
IMM GRANULOCYTES # BLD AUTO: 0.04 THOUSAND/UL (ref 0–0.2)
IMM GRANULOCYTES NFR BLD AUTO: 0 % (ref 0–2)
LDLC SERPL CALC-MCNC: 82 MG/DL (ref 0–100)
LYMPHOCYTES # BLD AUTO: 2.74 THOUSANDS/ΜL (ref 0.6–4.47)
LYMPHOCYTES NFR BLD AUTO: 30 % (ref 14–44)
MCH RBC QN AUTO: 32.9 PG (ref 26.8–34.3)
MCHC RBC AUTO-ENTMCNC: 34.4 G/DL (ref 31.4–37.4)
MCV RBC AUTO: 96 FL (ref 82–98)
MONOCYTES # BLD AUTO: 0.47 THOUSAND/ΜL (ref 0.17–1.22)
MONOCYTES NFR BLD AUTO: 5 % (ref 4–12)
NEUTROPHILS # BLD AUTO: 4.44 THOUSANDS/ΜL (ref 1.85–7.62)
NEUTS SEG NFR BLD AUTO: 50 % (ref 43–75)
NONHDLC SERPL-MCNC: 100 MG/DL
NRBC BLD AUTO-RTO: 0 /100 WBCS
PLATELET # BLD AUTO: 251 THOUSANDS/UL (ref 149–390)
PMV BLD AUTO: 10.3 FL (ref 8.9–12.7)
POTASSIUM SERPL-SCNC: 4 MMOL/L (ref 3.5–5)
PROT SERPL-MCNC: 7.3 G/DL (ref 6.4–8.3)
PSA SERPL-MCNC: 0.6 NG/ML (ref 0–4)
RBC # BLD AUTO: 5.14 MILLION/UL (ref 3.88–5.62)
SODIUM SERPL-SCNC: 140 MMOL/L (ref 133–145)
TRIGL SERPL-MCNC: 90.3 MG/DL
TSH SERPL DL<=0.05 MIU/L-ACNC: 1.49 UIU/ML (ref 0.34–5.6)
WBC # BLD AUTO: 9.08 THOUSAND/UL (ref 4.31–10.16)

## 2022-01-24 PROCEDURE — 36415 COLL VENOUS BLD VENIPUNCTURE: CPT

## 2022-01-24 PROCEDURE — 83036 HEMOGLOBIN GLYCOSYLATED A1C: CPT

## 2022-01-24 PROCEDURE — G0103 PSA SCREENING: HCPCS

## 2022-01-24 PROCEDURE — 85025 COMPLETE CBC W/AUTO DIFF WBC: CPT

## 2022-01-24 PROCEDURE — 80061 LIPID PANEL: CPT

## 2022-01-24 PROCEDURE — 84443 ASSAY THYROID STIM HORMONE: CPT

## 2022-01-24 PROCEDURE — 80053 COMPREHEN METABOLIC PANEL: CPT

## 2022-01-26 DIAGNOSIS — K21.00 GASTROESOPHAGEAL REFLUX DISEASE WITH ESOPHAGITIS WITHOUT HEMORRHAGE: ICD-10-CM

## 2022-01-26 RX ORDER — PANTOPRAZOLE SODIUM 20 MG/1
TABLET, DELAYED RELEASE ORAL
Qty: 60 TABLET | Refills: 1 | Status: SHIPPED | OUTPATIENT
Start: 2022-01-26 | End: 2022-03-12

## 2022-03-04 ENCOUNTER — OFFICE VISIT (OUTPATIENT)
Dept: OBGYN CLINIC | Facility: CLINIC | Age: 57
End: 2022-03-04
Payer: COMMERCIAL

## 2022-03-04 ENCOUNTER — APPOINTMENT (OUTPATIENT)
Dept: RADIOLOGY | Facility: AMBULARY SURGERY CENTER | Age: 57
End: 2022-03-04
Attending: ORTHOPAEDIC SURGERY
Payer: COMMERCIAL

## 2022-03-04 VITALS
DIASTOLIC BLOOD PRESSURE: 88 MMHG | HEART RATE: 80 BPM | WEIGHT: 315 LBS | SYSTOLIC BLOOD PRESSURE: 136 MMHG | BODY MASS INDEX: 38.36 KG/M2 | HEIGHT: 76 IN

## 2022-03-04 DIAGNOSIS — M22.2X2 PATELLOFEMORAL DISORDER OF LEFT KNEE: Primary | ICD-10-CM

## 2022-03-04 DIAGNOSIS — M25.562 LEFT KNEE PAIN, UNSPECIFIED CHRONICITY: ICD-10-CM

## 2022-03-04 DIAGNOSIS — M17.12 PRIMARY OSTEOARTHRITIS OF LEFT KNEE: ICD-10-CM

## 2022-03-04 PROCEDURE — 73562 X-RAY EXAM OF KNEE 3: CPT

## 2022-03-04 PROCEDURE — 99203 OFFICE O/P NEW LOW 30 MIN: CPT | Performed by: ORTHOPAEDIC SURGERY

## 2022-03-04 RX ORDER — BETAMETHASONE SODIUM PHOSPHATE AND BETAMETHASONE ACETATE 3; 3 MG/ML; MG/ML
12 INJECTION, SUSPENSION INTRA-ARTICULAR; INTRALESIONAL; INTRAMUSCULAR; SOFT TISSUE
Status: DISCONTINUED | OUTPATIENT
Start: 2022-03-04 | End: 2022-03-07

## 2022-03-04 RX ORDER — BUPIVACAINE HYDROCHLORIDE 2.5 MG/ML
1 INJECTION, SOLUTION INFILTRATION; PERINEURAL
Status: DISCONTINUED | OUTPATIENT
Start: 2022-03-04 | End: 2022-03-07

## 2022-03-04 RX ORDER — LIDOCAINE HYDROCHLORIDE 10 MG/ML
1 INJECTION, SOLUTION INFILTRATION; PERINEURAL
Status: DISCONTINUED | OUTPATIENT
Start: 2022-03-04 | End: 2022-03-07

## 2022-03-04 RX ORDER — MELOXICAM 15 MG/1
15 TABLET ORAL DAILY
Qty: 30 TABLET | Refills: 1 | Status: SHIPPED | OUTPATIENT
Start: 2022-03-04

## 2022-03-04 NOTE — PATIENT INSTRUCTIONS
PATELLOFEMORAL SYNDROME-Kim TAPING TECHNIQUE    Search Leukotape P tape and Cover roll stretch tape on  [employer]  Leukotape P is typically 1 5in x 15 yards, Cover roll stretch tape is typically 2in x 10 yards        How to apply:  1  Place cover roll tape over knee cap  This protects the skin  2  Apply Leukotape over the cover roll tape  Use the Leukotape to pull the knee cap to the middle of the body (medial side of knee) to prevent lateral (outside) tracking of the knee cap  3  Wear the tape for 3 days (72 hrs) straight, then take off one day (24 hrs) off, then repeat  4  Visit youtube  com and search Kim tape for the knee to watch a video on how to apply tape  a  Video titled Kim Taping of the knee created by [employer] recommended  What does Kim taping technique do?  ? Patellofemoral syndrome is when the inside quadriceps muscle, called the VMO muscle, becomes weak due to a number of factors  This causes the Patellofemoral ligament, which is the only ligament holding the patella (knee cap) in place, to become weak as well  When the ligament becomes weak, the knee cap drifts or tracks too far to the lateral side of the knee (outside knee) which causes tension on this ligament  The knee cap hits the lateral area of the femur, resulting in pain or discomfort around the front of the knee  ? Physical Therapy is sometimes used to strengthen the weak muscles, such as the VMO, to correct this problem  When the tape is applied correctly, it helps to realign the knee cap to the center of the knee  This helps correct for the lateral tracking of the knee cap and relieve discomfort  ? You can search online for exercises that can help strengthen the VMO quadriceps muscle or attend physical therapy

## 2022-03-04 NOTE — PROGRESS NOTES
Assessment/Plan:  1  Patellofemoral disorder of left knee  Large joint arthrocentesis: L knee    Ambulatory Referral to Physical Therapy    meloxicam (Mobic) 15 mg tablet   2  Left knee pain, unspecified chronicity  XR knee 3 vw left non injury   3  Primary osteoarthritis of left knee  Large joint arthrocentesis: L knee    Ambulatory Referral to Physical Therapy    meloxicam (Mobic) 15 mg tablet     Patient Active Problem List   Diagnosis    Acute bronchitis    Personal history of tobacco use    Simple chronic bronchitis (HCC)    Gastroesophageal reflux disease with esophagitis without hemorrhage    Redundant colon    Esophageal polyp    Hx of adenomatous colonic polyps    Maciel's esophagus without dysplasia    Overweight       Discussion/Summary:    49 y o  male with left knee patellofemoral disorder and quadriceps tightness, mild osteoarthritis     Intra-articular cortisone injection given today into L knee he tolerated this well   Cervantes taping and PT referral given   Follow up in 6 weeks if not 50% improved can consider trying visco once more, last time he had it last year was not effective however    The patient was seen and examined by Dr Cesar Driscoll and myself  The assessment and plan were formulated by Dr Cesar Driscoll and I assisted in carrying it out  Subjective:   Patient ID: Bran Goldstein is a 49 y o  male   HPI    Patient presents to the office complaining of left knee pain  Patient is referred to us by Thea Messer MD for consultation for these symptoms  Symptoms began 5-6 years ago, 6 months ago knee started flaring up again after a CSI  Pain is located anterior knee  Pain is described as constant, aching  The pain does not radiate beyond these areas  The pain is 4/10 at worst, 0/10 at best  It is made worse by keeping knee flexed for a long time, also has start up pain  It is made better by keeping knee slightly flexed   So far the patient has tried Injection (Cortisone) and Injection (Visco)  He was seeing Dr Miller Servando  The patient denies any numbness or tingling   The following portions of the patient's history were reviewed and updated as appropriate: allergies, current medications, past family history, past social history, past surgical history and problem list     Social History     Socioeconomic History    Marital status: /Civil Union     Spouse name: Not on file    Number of children: Not on file    Years of education: Not on file    Highest education level: Not on file   Occupational History    Not on file   Tobacco Use    Smoking status: Current Every Day Smoker     Packs/day: 2 00     Years: 36 00     Pack years: 72 00     Types: Cigarettes     Start date: 3/10/1984    Smokeless tobacco: Never Used   Vaping Use    Vaping Use: Never used   Substance and Sexual Activity    Alcohol use:  Yes    Drug use: Never    Sexual activity: Not on file   Other Topics Concern    Not on file   Social History Narrative    Not on file     Social Determinants of Health     Financial Resource Strain: Not on file   Food Insecurity: Not on file   Transportation Needs: Not on file   Physical Activity: Not on file   Stress: Not on file   Social Connections: Not on file   Intimate Partner Violence: Not on file   Housing Stability: Not on file     Past Medical History:   Diagnosis Date    Maciel's esophagus     Colon polyp     Cough 06/11/2021    had PFT due to 30 year plus smoker - uses Breo Inhaler for maintenance    Diabetes mellitus ([facility] [address] )     type 2, accu check 113 at 0700 on 6/11/21    Esophageal polyp 06/11/2021    GERD (gastroesophageal reflux disease)     Hypertension      Past Surgical History:   Procedure Laterality Date    COLONOSCOPY      UPPER GASTROINTESTINAL ENDOSCOPY       No Known Allergies  Current Outpatient Medications on File Prior to Visit   Medication Sig Dispense Refill    atorvastatin (LIPITOR) 40 mg tablet       fluticasone-vilanterol (BREO ELLIPTA) 200-25 MCG/INH inhaler       hydrochlorothiazide (HYDRODIURIL) 25 mg tablet Take 25 mg by mouth daily      losartan (COZAAR) 100 MG tablet Take 100 mg by mouth daily      metFORMIN (GLUCOPHAGE) 1000 MG tablet Take 1,000 mg by mouth 2 (two) times a day      Multiple Vitamins-Minerals (multivitamin with minerals) tablet Take 1 tablet by mouth daily      Ozempic, 0 25 or 0 5 MG/DOSE, 2 MG/1 5ML SOPN USE AS DIRECTED- 0 5MG ONCE WEEKLY      pantoprazole (PROTONIX) 20 mg tablet TAKE ONE TABLET "ONE-HALF"-HOUR BEFORE BREAKFAST AND IN THE EVENING 60 tablet 1    nystatin (MYCOSTATIN) 500,000 units/5 mL suspension Apply 5 mL (500,000 Units total) to the mouth or throat 4 (four) times a day 150 mL 0    Semaglutide (OZEMPIC, 0 25 OR 0 5 MG/DOSE, SC)        No current facility-administered medications on file prior to visit  Review of Systems   Constitutional: Negative for chills, fever and unexpected weight change  HENT: Negative for hearing loss, nosebleeds and sore throat  Eyes: Negative for pain, redness and visual disturbance  Respiratory: Negative for cough, shortness of breath and wheezing  Cardiovascular: Negative for chest pain, palpitations and leg swelling  Gastrointestinal: Negative for abdominal pain, nausea and vomiting  Endocrine: Negative for polydipsia and polyuria  Genitourinary: Negative for dysuria and hematuria  Musculoskeletal:          As noted in HPI   Skin: Negative for rash and wound  Neurological: Negative for dizziness, numbness and headaches  Psychiatric/Behavioral: Negative for decreased concentration, dysphoric mood and suicidal ideas  The patient is not nervous/anxious  Objective:    Vitals:    03/04/22 0759   BP: 136/88   Pulse: 80       Physical Exam  Constitutional:       General: He is not in acute distress  Appearance: He is well-developed  HENT:      Head: Normocephalic and atraumatic  Eyes:      General: No scleral icterus  Conjunctiva/sclera: Conjunctivae normal    Neck:      Trachea: No tracheal deviation  Cardiovascular:      Comments: No discernible arrhthymias   Pulmonary:      Effort: Pulmonary effort is normal  No respiratory distress  Musculoskeletal:      Cervical back: Neck supple  Left knee: No effusion  Instability Tests: Medial Wen test negative and lateral Wen test negative  Skin:     General: Skin is warm and dry  Neurological:      Mental Status: He is alert  Psychiatric:         Behavior: Behavior normal          Right Knee Exam     Range of Motion   Extension: normal   Flexion:  130 abnormal       Left Knee Exam     Muscle Strength   The patient has normal left knee strength  Tenderness   The patient is experiencing tenderness in the medial joint line, medial retinaculum and patella (lateral patellar facet tenderness)  Range of Motion   Extension: normal   Flexion:  90 abnormal     Tests   Wen:  Medial - negative Lateral - negative  Varus: negative Valgus: negative  Patellar apprehension: negative    Other   Erythema: absent  Scars: absent  Sensation: normal  Left knee pulse absent: skin warm and well perfused  Swelling: none  Effusion: no effusion present    Comments:  No ecchymosis or deformity  Negative patellar grind test            I have personally reviewed pertinent films in PACS and my interpretation is XR left knee shows mild tricompartmental arthritis  Large joint arthrocentesis            Portions of the record may have been created with voice recognition software  Occasional wrong word or "sound a like" substitutions may have occurred due to the inherent limitations of voice recognition software  Read the chart carefully and recognize, using context, where substitutions have occurred

## 2022-03-07 NOTE — PROGRESS NOTES
Assessment/Plan:  1  Patellofemoral disorder of left knee  Ambulatory Referral to Physical Therapy    meloxicam (Mobic) 15 mg tablet    DISCONTINUED: lidocaine (XYLOCAINE) 1 % injection 1 mL    DISCONTINUED: betamethasone acetate-betamethasone sodium phosphate (CELESTONE) injection 12 mg    DISCONTINUED: bupivacaine (MARCAINE) 0 25 % injection 1 mL   2  Left knee pain, unspecified chronicity  XR knee 3 vw left non injury   3  Primary osteoarthritis of left knee  Ambulatory Referral to Physical Therapy    meloxicam (Mobic) 15 mg tablet    DISCONTINUED: lidocaine (XYLOCAINE) 1 % injection 1 mL    DISCONTINUED: betamethasone acetate-betamethasone sodium phosphate (CELESTONE) injection 12 mg    DISCONTINUED: bupivacaine (MARCAINE) 0 25 % injection 1 mL    CANCELED: Large joint arthrocentesis     Patient Active Problem List   Diagnosis    Acute bronchitis    Personal history of tobacco use    Simple chronic bronchitis (HCC)    Gastroesophageal reflux disease with esophagitis without hemorrhage    Redundant colon    Esophageal polyp    Hx of adenomatous colonic polyps    Maciel's esophagus without dysplasia    Overweight       Discussion/Summary:    49 y o  male  with left knee patellofemoral disorder and quadriceps tightness, mild osteoarthritis    · Cervantes taping and PT referral given  · Follow up in 6 weeks if not 50% improved can consider trying visco once more, last time he had it last year was not effective however    The patient was seen and examined by Dr Phani Thacker and myself  The assessment and plan were formulated by Dr Phani Thacker and I assisted in carrying it out  Subjective:   Patient ID: Aisha Li is a 49 y o  male   HPI    Patient presents to the office complaining of left knee pain  Patient is referred to us by Alpa Joseph MD for consultation for these symptoms  Symptoms began 5-6 years ago, 6 months ago knee started flaring up again after a CSI  Pain is located anterior knee   Pain is described as constant, aching  The pain does not radiate beyond these areas  The pain is 4/10 at worst, 0/10 at best  It is made worse by keeping knee flexed for a long time, also has start up pain  It is made better by keeping knee slightly flexed  So far the patient has tried Injection (Cortisone) and Injection (Visco)  He was seeing Dr Leigh Bishop  The patient denies any numbness or tingling   The following portions of the patient's history were reviewed and updated as appropriate: allergies, current medications, past family history, past social history, past surgical history and problem list     Social History     Socioeconomic History    Marital status: /Civil Union     Spouse name: Not on file    Number of children: Not on file    Years of education: Not on file    Highest education level: Not on file   Occupational History    Not on file   Tobacco Use    Smoking status: Current Every Day Smoker     Packs/day: 2 00     Years: 36 00     Pack years: 72 00     Types: Cigarettes     Start date: 3/10/1984    Smokeless tobacco: Never Used   Vaping Use    Vaping Use: Never used   Substance and Sexual Activity    Alcohol use:  Yes    Drug use: Never    Sexual activity: Not on file   Other Topics Concern    Not on file   Social History Narrative    Not on file     Social Determinants of Health     Financial Resource Strain: Not on file   Food Insecurity: Not on file   Transportation Needs: Not on file   Physical Activity: Not on file   Stress: Not on file   Social Connections: Not on file   Intimate Partner Violence: Not on file   Housing Stability: Not on file     Past Medical History:   Diagnosis Date    Maciel's esophagus     Colon polyp     Cough 06/11/2021    had PFT due to 30 year plus smoker - uses Breo Inhaler for maintenance    Diabetes mellitus ([facility] [address] )     type 2, accu check 113 at 0700 on 6/11/21    Esophageal polyp 06/11/2021    GERD (gastroesophageal reflux disease)     Hypertension Past Surgical History:   Procedure Laterality Date    COLONOSCOPY      UPPER GASTROINTESTINAL ENDOSCOPY       No Known Allergies  Current Outpatient Medications on File Prior to Visit   Medication Sig Dispense Refill    atorvastatin (LIPITOR) 40 mg tablet       fluticasone-vilanterol (BREO ELLIPTA) 200-25 MCG/INH inhaler       hydrochlorothiazide (HYDRODIURIL) 25 mg tablet Take 25 mg by mouth daily      losartan (COZAAR) 100 MG tablet Take 100 mg by mouth daily      metFORMIN (GLUCOPHAGE) 1000 MG tablet Take 1,000 mg by mouth 2 (two) times a day      Multiple Vitamins-Minerals (multivitamin with minerals) tablet Take 1 tablet by mouth daily      Ozempic, 0 25 or 0 5 MG/DOSE, 2 MG/1 5ML SOPN USE AS DIRECTED- 0 5MG ONCE WEEKLY      pantoprazole (PROTONIX) 20 mg tablet TAKE ONE TABLET "ONE-HALF"-HOUR BEFORE BREAKFAST AND IN THE EVENING 60 tablet 1    nystatin (MYCOSTATIN) 500,000 units/5 mL suspension Apply 5 mL (500,000 Units total) to the mouth or throat 4 (four) times a day 150 mL 0    Semaglutide (OZEMPIC, 0 25 OR 0 5 MG/DOSE, SC)        No current facility-administered medications on file prior to visit  Review of Systems   Constitutional: Negative for chills, fever and unexpected weight change  HENT: Negative for hearing loss, nosebleeds and sore throat  Eyes: Negative for pain, redness and visual disturbance  Respiratory: Negative for cough, shortness of breath and wheezing  Cardiovascular: Negative for chest pain, palpitations and leg swelling  Gastrointestinal: Negative for abdominal pain, nausea and vomiting  Endocrine: Negative for polydipsia and polyuria  Genitourinary: Negative for dysuria and hematuria  Musculoskeletal:          As noted in HPI   Skin: Negative for rash and wound  Neurological: Negative for dizziness, numbness and headaches  Psychiatric/Behavioral: Negative for decreased concentration, dysphoric mood and suicidal ideas   The patient is not nervous/anxious  Objective:    Vitals:    03/04/22 0759   BP: 136/88   Pulse: 80       Physical Exam  Constitutional:       General: He is not in acute distress  Appearance: He is well-developed  HENT:      Head: Normocephalic and atraumatic  Eyes:      General: No scleral icterus  Conjunctiva/sclera: Conjunctivae normal    Neck:      Trachea: No tracheal deviation  Pulmonary:      Effort: Pulmonary effort is normal  No respiratory distress  Musculoskeletal:      Cervical back: Neck supple  Right knee: No effusion  Instability Tests: Medial Wen test negative  Skin:     General: Skin is warm and dry  Neurological:      Mental Status: He is alert  Psychiatric:         Behavior: Behavior normal          Right Knee Exam     Range of Motion   Extension: normal   Flexion:  90 abnormal     Tests   Wen:  Medial - negative   Varus: negative Valgus: negative    Other   Erythema: absent  Scars: absent  Sensation: normal  Pulse: present  Swelling: none  Effusion: no effusion present    Comments:  Negative patellar grind test      Left Knee Exam     Muscle Strength   The patient has normal left knee strength  Range of Motion   Extension: normal   Flexion: 130             I have personally reviewed pertinent films in PACS and my interpretation is XR left knee shows mild tricompartmental arthritis  Procedures  No Procedures performed today    Portions of the record may have been created with voice recognition software  Occasional wrong word or "sound a like" substitutions may have occurred due to the inherent limitations of voice recognition software  Read the chart carefully and recognize, using context, where substitutions have occurred

## 2022-03-11 DIAGNOSIS — K21.00 GASTROESOPHAGEAL REFLUX DISEASE WITH ESOPHAGITIS WITHOUT HEMORRHAGE: ICD-10-CM

## 2022-03-12 RX ORDER — PANTOPRAZOLE SODIUM 20 MG/1
TABLET, DELAYED RELEASE ORAL
Qty: 60 TABLET | Refills: 1 | Status: SHIPPED | OUTPATIENT
Start: 2022-03-12 | End: 2022-05-05

## 2022-03-21 ENCOUNTER — EVALUATION (OUTPATIENT)
Dept: PHYSICAL THERAPY | Facility: REHABILITATION | Age: 57
End: 2022-03-21
Payer: COMMERCIAL

## 2022-03-21 DIAGNOSIS — M22.2X2 PATELLOFEMORAL DISORDER OF LEFT KNEE: Primary | ICD-10-CM

## 2022-03-21 DIAGNOSIS — M17.12 PRIMARY OSTEOARTHRITIS OF LEFT KNEE: ICD-10-CM

## 2022-03-21 PROCEDURE — 97110 THERAPEUTIC EXERCISES: CPT | Performed by: PHYSICAL THERAPIST

## 2022-03-21 PROCEDURE — 97161 PT EVAL LOW COMPLEX 20 MIN: CPT | Performed by: PHYSICAL THERAPIST

## 2022-03-21 NOTE — PROGRESS NOTES
PT Evaluation     Today's date: 3/21/2022  Patient name: Lakeisha Rodriguez  : 1965  MRN: [id number]  Referring provider: Wilma Yu  Dx:   Encounter Diagnosis     ICD-10-CM    1  Patellofemoral disorder of left knee  M22 2X2 Ambulatory Referral to Physical Therapy   2  Primary osteoarthritis of left knee  M17 12 Ambulatory Referral to Physical Therapy       Start Time: 1708  Stop Time: 1803  Total time in clinic (min): 55 minutes    Assessment  Assessment details: Lakeisha Rodriguez is a 49y o  year old male who presents with chronic L knee pain  Current deficits include impaired L knee ROM, positive special testing, decreased muscle length and increased soft tissue tension, and pain  These deficits impair his ability to perform all bending/squatting based movement patterns, navigate stairs, and perform functional transfers such as sit to stands especially from low surfaces due to pain  Suspect pain is a result of decreased muscle length and increased soft tissue tension throughout the hip flexors and knee extensors  Recommend skilled PT services to address previously stated deficits to promote progress towards PLOF  Impairments: abnormal or restricted ROM, activity intolerance, impaired physical strength, lacks appropriate home exercise program and pain with function  Understanding of Dx/Px/POC: good   Prognosis: good    Goals  STG (3 weeks):  1  L knee flexion ROM equal to R to promote improved activity tolerance  2  Increase L knee extension strength to at least 5/5 for improved activity tolerance  3  Decrease pain at worst from 5/10 to 3/10 or less for improved QoL  LTG (6 weeks):  1  Increased global knee strength to 5/5 to promote improved activity tolerance  2  Able to stand from a low surface without symptom exacerbation to promote ability get on/off toilet    3  Able to sit for at least 2 hours without symptom exacerbation to promote improved positional   4  Able to lay supine without L knee pain to promote improved sleep quality  5  Able to ascend/descend 1 flight of stairs without compensation to promote improved household and community ambulation  6  Independent with HEP  Plan  Plan details: Thank you for referring Jeanine Anton to Physical Therapy at [place] [id number] and for the opportunity to coordinate care  Patient would benefit from: skilled physical therapy  Planned modality interventions: cryotherapy, electrical stimulation/Russian stimulation, TENS, thermotherapy: hydrocollator packs and unattended electrical stimulation  Planned therapy interventions: abdominal trunk stabilization, activity modification, ADL retraining, balance, balance/weight bearing training, body mechanics training, behavior modification, breathing training, fine motor coordination training, flexibility, functional ROM exercises, gait training, graded activity, graded motor, home exercise program, graded exercise, work reintegration, transfer training, therapeutic training, therapeutic exercise, therapeutic activities, stretching, strengthening, self care, postural training, patient education, neuromuscular re-education, muscle pump exercises, motor coordination training, Cervantes taping, manual therapy, joint mobilization and IADL retraining  Frequency: 1x week  Duration in weeks: 6  Plan of Care beginning date: 3/21/2022  Plan of Care expiration date: 5/2/2022  Treatment plan discussed with: patient        Subjective Evaluation    History of Present Illness  Mechanism of injury:   03/21/22:  Jeanine Anton presents to skilled physical therapy with complaints of chronic L knee pain  Roshan Schwab reports he had L knee pain about 5 years and responded well to a cortisone injection  States the L knee pain began about 5-6 months ago and did not respond to a series of three injections; declines a cortisone injection at this time in favor of trying physical therapy   Roshan Schwab reports pain located anterior from the quad tendon to the level of the tibial tuberosity  Since onset, symptoms have stayed the same  Currently, Johan Root is having difficulty with sitting (>1 hour), standing from a low surface such as the toilet, pain when getting up from the floor, going up and down stairs, and with increased activity  Patient admits to difficulty sleeping secondary to L knee discomfort when laying supine with the L knee straight  Patient denies numbness, tingling in left leg and left foot  Pain  Current pain ratin  At best pain ratin  At worst pain ratin  Location: L knee  Quality: dull ache  Relieving factors: rest    Social Support  Steps to enter house: yes  2  Stairs in house: yes   Lives in: [employer] boyce house (with basement)  Lives with: spouse, adult children and parents    Employment status: working ([employer])    Diagnostic Tests  Abnormal x-ray: 3/4/22: Mild osteoarthritis with narrowing of the medial tibiofemoral joint and small osteophytes  Treatments  Current treatment: physical therapy  Patient Goals  Patient goals for therapy: decreased pain          Objective     Observations   Left Knee   Positive for edema  Negative for adhesive scar, drainage and incision  Palpation   Left   Hypertonic in the rectus femoris and vastus lateralis  Tenderness of the vastus lateralis  Tenderness   Left Knee   No tenderness in the inferior patella, lateral patella, LCL (distal), LCL (proximal), MCL (distal), MCL (proximal), medial joint line, medial patella, patellar tendon, pes anserinus, quadriceps tendon, superior patella and tibial tubercle  Active Range of Motion   Left Knee   Flexion: 130 degrees with pain  Extension: 0 degrees     Right Knee   Flexion: 140 degrees   Extension: 0 degrees     Mobility   Patellar Mobility:   Left Knee   WFL: medial, lateral, superior and inferior       Right Knee   WFL: medial, lateral, superior and inferior    Strength/Myotome Testing     Left Knee   Flexion: 4+  Extension: 4+    Right Knee   Flexion: 4+  Extension: 4+    Additional Strength Details  L glute max MMT: 5/5  R glute max MMT: 4+/5  L hamstring MMT: 5/5  R hamstring MMT: 5/5      Tests     Left Knee   Positive patellar compression  Negative anterior drawer, lateral Wen, medial Wen, posterior drawer, posterior sag, valgus stress test at 0 degrees, valgus stress test at 30 degrees, varus stress test at 0 degrees and varus stress test at 30 degrees       Additional Tests Details  L Ely's test (+)  L Jonathan test (+)             Precautions: HTN, GERD, DM type II    * Indicates part of HEP   HEP code: [id number]     Daily Treatment Diary     Manuals 3/21          Quad release                                 Therapeutic Exercise           Bike  nv          Hamstring stretch           Prone quad stretch 10x10" ea          Piriformis stretch           SLR flexion 2x10 ea          SLR abduction                                            Patient education done          Neuro Re-ed           Bridges nv          Clamshells nv          Lateral eccentric step down           Forward eccentric step down                                                       Therapeutic Activity           Sit to stands           Leg press nv          Heel raises           Toe raises           Band resisted side stepping nv          Monster walks           Goblet squat           Deadlift           Forward step ups nv          Lateral step ups                                                       Modalities

## 2022-03-31 ENCOUNTER — OFFICE VISIT (OUTPATIENT)
Dept: PHYSICAL THERAPY | Facility: REHABILITATION | Age: 57
End: 2022-03-31
Payer: COMMERCIAL

## 2022-03-31 DIAGNOSIS — M22.2X2 PATELLOFEMORAL DISORDER OF LEFT KNEE: Primary | ICD-10-CM

## 2022-03-31 DIAGNOSIS — M17.12 PRIMARY OSTEOARTHRITIS OF LEFT KNEE: ICD-10-CM

## 2022-03-31 PROCEDURE — 97110 THERAPEUTIC EXERCISES: CPT

## 2022-03-31 PROCEDURE — 97112 NEUROMUSCULAR REEDUCATION: CPT

## 2022-03-31 NOTE — PROGRESS NOTES
Daily Note     Today's date: 3/31/2022  Patient name: Markus Loera  : 1965  MRN: [id number]  Referring provider: Isadora Jones  Dx:   Encounter Diagnosis     ICD-10-CM    1  Patellofemoral disorder of left knee  M22 2X2    2  Primary osteoarthritis of left knee  M17 12        Start Time: 1700  Stop Time: 1750  Total time in clinic (min): 50 minutes    Subjective: Patient reports increase pain (L) knee yesterday as a result of increased activity, but states that today it is a little bit better prior to today's visit  Objective: See treatment diary below      Assessment: POC was initiated as charted below and tolerated fair  All new activities demonstrated prior to patient performance  Patient reported decrease pain post warm up  During SLR patient exhibit mild extension lag on R>L LE  VCs and tactile cues utilized during bridging to keep knees, ankle and hips in-line as patient eccentrically lowers  During side stepping cues utilized to prevent foot drag  Seated GS and HS stretch added d/t calf/ HS cramping post lateral banded walks which was effective in reducing tone  Figure 4 also added to reduce nerve tension that may have contributed to pain which was effective  Patient noted that he felt possible signs of irritation starting in HS/ Calf during clamshells  Patient demonstrated fatigue post treatment, exhibited good technique with therapeutic exercises and would benefit from continued PT  Plan: Continue per plan of care  Monitor quad strength and fatigue in response to SLR and add more reps if appropriate        Precautions: HTN, GERD, DM type II    * Indicates part of HEP   HEP code: [id number]     Daily Treatment Diary     Manuals 3/21 3/31         Quad release                                 Therapeutic Exercise           Bike  nv 5' min L0         Hamstring stretch           Prone quad stretch 10x10" ea 10x10" ea         Piriformis stretch           SLR flexion 2x10 ea 2x10ea SLR abduction           Seated figure 4 with downward OP  5" x10                               Patient education done Done- edu on calf/ HS tension         Neuro Re-ed           Bridges nv 3x5         Clamshells nv 2x10ea         Lateral eccentric step down           Forward eccentric step down                                                       Therapeutic Activity           Sit to stands           Leg press nv NV         Heel raises           Toe raises           Band resisted side stepping nv PTB 3 laps         Monster walks           Goblet squat           Deadlift           Forward step ups nv HELD - calf/ hs pain 4" x15 ea?         Lateral step ups                                                       Modalities

## 2022-04-07 ENCOUNTER — OFFICE VISIT (OUTPATIENT)
Dept: PHYSICAL THERAPY | Facility: REHABILITATION | Age: 57
End: 2022-04-07
Payer: COMMERCIAL

## 2022-04-07 DIAGNOSIS — M17.12 PRIMARY OSTEOARTHRITIS OF LEFT KNEE: ICD-10-CM

## 2022-04-07 DIAGNOSIS — M22.2X2 PATELLOFEMORAL DISORDER OF LEFT KNEE: Primary | ICD-10-CM

## 2022-04-07 PROCEDURE — 97110 THERAPEUTIC EXERCISES: CPT | Performed by: PHYSICAL THERAPIST

## 2022-04-07 PROCEDURE — 97140 MANUAL THERAPY 1/> REGIONS: CPT | Performed by: PHYSICAL THERAPIST

## 2022-04-07 PROCEDURE — 97530 THERAPEUTIC ACTIVITIES: CPT | Performed by: PHYSICAL THERAPIST

## 2022-04-07 NOTE — PROGRESS NOTES
Daily Note     Today's date: 2022  Patient name: Charles Mota  : 1965  MRN: [id number]  Referring provider: Terrence Homans  Dx:   Encounter Diagnosis     ICD-10-CM    1  Patellofemoral disorder of left knee  M22 2X2    2  Primary osteoarthritis of left knee  M17 12        Start Time: 1620  Stop Time: 1715  Total time in clinic (min): 55 minutes    Subjective: Amaury Sterling reports his knee is a bit painful at start of session  Continues to use the tape which helps decrease pain  Objective: See treatment diary below      Assessment: Tolerated treatment well  Introduced goblet squats as taps to chair with fair tolerance; did note L knee pain at end range with improvement of foam boost  Reported L knee discomfort following goblet squats but improved following heel/toe raises  No other reports of pain throughout session and reported improved status at end of visit compared to start of session  Patient demonstrated fatigue post treatment, exhibited good technique with therapeutic exercises and would benefit from continued PT  Plan: Continue per plan of care  Progress treatment as tolerated         Precautions: HTN, GERD, DM type II    * Indicates part of HEP   HEP code: [id number]     Daily Treatment Diary     Manuals 3/21 3/31 4/7        Quad release   Done L        Patellar mobility   Done L                   Therapeutic Exercise           Bike  nv 5' min L0 5' lvl 2        Hamstring stretch           Prone quad stretch 10x10" ea 10x10" ea 10x10" ea        Piriformis stretch           SLR flexion 2x10 ea 2x10ea 3x10 ea        SLR abduction           Seated figure 4 with downward OP  5" x10                               Patient education done Done- edu on calf/ HS tension         Neuro Re-ed           Bridges nv 3x5 3x10        Clamshells nv 2x10ea 2x12 ea        Lateral eccentric step down           Forward eccentric step down                                                       Therapeutic Activity           Sit to stands           Leg press nv # 3x10 P9S3        Heel raises   3x10        Toe raises   3x10        Band resisted side stepping nv PTB 3 laps         Monster walks           Goblet squat   Tap to chair w/ 2 foam boost 3x10        Deadlift           Forward step ups nv HELD - calf/ hs pain         Lateral step ups                                                       Modalities

## 2022-04-14 ENCOUNTER — OFFICE VISIT (OUTPATIENT)
Dept: PHYSICAL THERAPY | Facility: REHABILITATION | Age: 57
End: 2022-04-14
Payer: COMMERCIAL

## 2022-04-14 DIAGNOSIS — M17.12 PRIMARY OSTEOARTHRITIS OF LEFT KNEE: ICD-10-CM

## 2022-04-14 DIAGNOSIS — M22.2X2 PATELLOFEMORAL DISORDER OF LEFT KNEE: Primary | ICD-10-CM

## 2022-04-14 PROCEDURE — 97112 NEUROMUSCULAR REEDUCATION: CPT | Performed by: PHYSICAL THERAPIST

## 2022-04-14 PROCEDURE — 97530 THERAPEUTIC ACTIVITIES: CPT | Performed by: PHYSICAL THERAPIST

## 2022-04-14 PROCEDURE — 97110 THERAPEUTIC EXERCISES: CPT | Performed by: PHYSICAL THERAPIST

## 2022-04-14 NOTE — PROGRESS NOTES
Daily Note     Today's date: 2022  Patient name: Antionette Mays  : 1965  MRN: [id number]  Referring provider: Ruben Saravia  Dx:   Encounter Diagnosis     ICD-10-CM    1  Patellofemoral disorder of left knee  M22 2X2    2  Primary osteoarthritis of left knee  M17 12        Start Time: 1615  Stop Time: 1703  Total time in clinic (min): 48 minutes    Subjective: Diana Pitt reports his knee didn't really bother him today until he sat down for today's appointment  States he has a follow up with MD tomorrow  Objective: See treatment diary below      Assessment: Tolerated treatment well  Improved tolerance to goblet squats with verbal cuing to keep weight shifted towards heels to minimize sheer forces with excessive anterior translation of the knees  Patient demonstrated appropriate level of challenge and muscular fatigue throughout session and noted good status at end of visit  Patient demonstrated fatigue post treatment, exhibited good technique with therapeutic exercises and would benefit from continued PT  Plan: Continue per plan of care  Progress treatment as tolerated         Precautions: HTN, GERD, DM type II    * Indicates part of HEP   HEP code: [id number]     Daily Treatment Diary     Manuals 3/21 3/31 4/7 4/14       Quad release   Done L        Patellar mobility   Done L                   Therapeutic Exercise           Bike  nv 5' min L0 5' lvl 2 5' lvl 3       Hamstring stretch           Prone quad stretch 10x10" ea 10x10" ea 10x10" ea 10x10" ea       Piriformis stretch           SLR flexion 2x10 ea 2x10ea 3x10 ea 3x10 ea       SLR abduction           Seated figure 4 with downward OP  5" x10                               Patient education done Done- edu on calf/ HS tension         Neuro Re-ed           Bridges nv 3x5 3x10 3x10       Clamshells nv 2x10ea 2x12 ea 3x10 ea       Lateral eccentric step down           Forward eccentric step down Therapeutic Activity           Sit to stands           Leg press nv # 3x10 P9S3 145# 3x10 P9S3       Heel raises   3x10 3x10       Toe raises   3x10 3x10       Band resisted side stepping nv PTB 3 laps         Monster walks           Goblet squat   Tap to chair w/ 2 foam boost 3x10 Tap to chair w/ 2 foam boost 3x10       Deadlift           Forward step ups nv HELD - calf/ hs pain  8" step up/back 2x10 ea       Lateral step ups                                                       Modalities

## 2022-04-15 ENCOUNTER — OFFICE VISIT (OUTPATIENT)
Dept: OBGYN CLINIC | Facility: CLINIC | Age: 57
End: 2022-04-15
Payer: COMMERCIAL

## 2022-04-15 VITALS
HEIGHT: 76 IN | DIASTOLIC BLOOD PRESSURE: 81 MMHG | BODY MASS INDEX: 38.36 KG/M2 | HEART RATE: 71 BPM | WEIGHT: 315 LBS | SYSTOLIC BLOOD PRESSURE: 133 MMHG

## 2022-04-15 DIAGNOSIS — M25.562 LEFT KNEE PAIN, UNSPECIFIED CHRONICITY: Primary | ICD-10-CM

## 2022-04-15 DIAGNOSIS — M22.2X2 PATELLOFEMORAL DISORDER OF LEFT KNEE: ICD-10-CM

## 2022-04-15 DIAGNOSIS — M17.12 PRIMARY OSTEOARTHRITIS OF LEFT KNEE: ICD-10-CM

## 2022-04-15 PROCEDURE — 20610 DRAIN/INJ JOINT/BURSA W/O US: CPT | Performed by: ORTHOPAEDIC SURGERY

## 2022-04-15 PROCEDURE — 99212 OFFICE O/P EST SF 10 MIN: CPT | Performed by: ORTHOPAEDIC SURGERY

## 2022-04-15 RX ORDER — BETAMETHASONE SODIUM PHOSPHATE AND BETAMETHASONE ACETATE 3; 3 MG/ML; MG/ML
6 INJECTION, SUSPENSION INTRA-ARTICULAR; INTRALESIONAL; INTRAMUSCULAR; SOFT TISSUE
Status: COMPLETED | OUTPATIENT
Start: 2022-04-15 | End: 2022-04-15

## 2022-04-15 RX ORDER — BUPIVACAINE HYDROCHLORIDE 2.5 MG/ML
1 INJECTION, SOLUTION INFILTRATION; PERINEURAL
Status: COMPLETED | OUTPATIENT
Start: 2022-04-15 | End: 2022-04-15

## 2022-04-15 RX ORDER — LIDOCAINE HYDROCHLORIDE 20 MG/ML
0.5 INJECTION, SOLUTION EPIDURAL; INFILTRATION; INTRACAUDAL; PERINEURAL
Status: COMPLETED | OUTPATIENT
Start: 2022-04-15 | End: 2022-04-15

## 2022-04-15 RX ADMIN — LIDOCAINE HYDROCHLORIDE 0.5 ML: 20 INJECTION, SOLUTION EPIDURAL; INFILTRATION; INTRACAUDAL; PERINEURAL at 08:08

## 2022-04-15 RX ADMIN — BETAMETHASONE SODIUM PHOSPHATE AND BETAMETHASONE ACETATE 6 MG: 3; 3 INJECTION, SUSPENSION INTRA-ARTICULAR; INTRALESIONAL; INTRAMUSCULAR; SOFT TISSUE at 08:08

## 2022-04-15 RX ADMIN — BUPIVACAINE HYDROCHLORIDE 1 ML: 2.5 INJECTION, SOLUTION INFILTRATION; PERINEURAL at 08:08

## 2022-04-21 ENCOUNTER — APPOINTMENT (OUTPATIENT)
Dept: PHYSICAL THERAPY | Facility: REHABILITATION | Age: 57
End: 2022-04-21
Payer: COMMERCIAL

## 2022-04-28 ENCOUNTER — EVALUATION (OUTPATIENT)
Dept: PHYSICAL THERAPY | Facility: REHABILITATION | Age: 57
End: 2022-04-28
Payer: COMMERCIAL

## 2022-04-28 DIAGNOSIS — M22.2X2 PATELLOFEMORAL DISORDER OF LEFT KNEE: Primary | ICD-10-CM

## 2022-04-28 DIAGNOSIS — M17.12 PRIMARY OSTEOARTHRITIS OF LEFT KNEE: ICD-10-CM

## 2022-04-28 PROCEDURE — 97530 THERAPEUTIC ACTIVITIES: CPT | Performed by: PHYSICAL THERAPIST

## 2022-04-28 PROCEDURE — 97110 THERAPEUTIC EXERCISES: CPT | Performed by: PHYSICAL THERAPIST

## 2022-04-28 PROCEDURE — 97112 NEUROMUSCULAR REEDUCATION: CPT | Performed by: PHYSICAL THERAPIST

## 2022-04-28 NOTE — PROGRESS NOTES
PT Re-Evaluation     Today's date: 2022  Patient name: Melani Pedroza  : 1965  MRN: [id number]  Referring provider: Wendy Green  Dx:   Encounter Diagnosis     ICD-10-CM    1  Patellofemoral disorder of left knee  M22 2X2    2  Primary osteoarthritis of left knee  M17 12        Start Time: 1615  Stop Time: 1706  Total time in clinic (min): 51 minutes    Assessment  Assessment details: Per patient report and clinical findings, Melani Pedroza has made good progress since starting skilled physical therapy services  Melani Pedroza has been compliant with PT POC and HEP since initial evaluation  To this date, Melani Pedroza has completed 5 visits of skilled physical therapy  Melani Pedroza demonstrates improvements in objective data however, continues to be limited compared to his prior level of function  Remaining deficits include decreased soft tissue length, increased soft tissue tension, and pain with squat based movement patterns which impair his ability to perform sit>stand transfers  Recommend continued skilled physical therapy services to address remaining deficits to promote progress towards his prior level of function and to ensure continued improvement following injection  Impairments: abnormal or restricted ROM, activity intolerance, impaired physical strength, lacks appropriate home exercise program and pain with function  Understanding of Dx/Px/POC: good   Prognosis: good    Goals  STG (3 weeks):  1  L knee flexion ROM equal to R to promote improved activity tolerance  - met  2  Increase L knee extension strength to at least 5/5 for improved activity tolerance  - met  3  Decrease pain at worst from 5/10 to 3/10 or less for improved QoL  - met    LTG (6 weeks):  1  Increased global knee strength to 5/5 to promote improved activity tolerance  - met  2  Able to stand from a low surface without symptom exacerbation to promote ability get on/off toilet  - progressing  3   Able to sit for at least 2 hours without symptom exacerbation to promote improved positional  - met  4  Able to lay supine without L knee pain to promote improved sleep quality  - met  5  Able to ascend/descend 1 flight of stairs without compensation to promote improved household and community ambulation  - met  6  Independent with HEP  - met      Plan  Plan details: Thank you for referring Juan Carlos Hawley to Physical Therapy at [place] [id number] and for the opportunity to coordinate care  Patient would benefit from: skilled physical therapy  Planned modality interventions: cryotherapy, electrical stimulation/Russian stimulation, TENS, thermotherapy: hydrocollator packs and unattended electrical stimulation  Planned therapy interventions: abdominal trunk stabilization, activity modification, ADL retraining, balance, balance/weight bearing training, body mechanics training, behavior modification, breathing training, fine motor coordination training, flexibility, functional ROM exercises, gait training, graded activity, graded motor, home exercise program, graded exercise, work reintegration, transfer training, therapeutic training, therapeutic exercise, therapeutic activities, stretching, strengthening, self care, postural training, patient education, neuromuscular re-education, muscle pump exercises, motor coordination training, Cervantes taping, manual therapy, joint mobilization and IADL retraining  Frequency: 1x week  Duration in weeks: 4  Plan of Care beginning date: 4/28/2022  Plan of Care expiration date: 5/26/2022  Treatment plan discussed with: patient        Subjective Evaluation    History of Present Illness  Mechanism of injury:   04/28/22:  Juan Carlos Hawley reports feeling he has made 90% progress since IE  States he received an injection in the knee on 4/15/22 and will likely be schedule in May  Notes improvement in the daily ache in the knee since receiving the injection   Reports improvement in ability to lay supine, sitting tolerance, going up/down stairs, getting up/down from the floor  Does continue to note pain in the knee when standing up from a low surface, but has improved somewhat      22:  Antonio Figueroa presents to skilled physical therapy with complaints of chronic L knee pain  Soham Arguello reports he had L knee pain about 5 years and responded well to a cortisone injection  States the L knee pain began about 5-6 months ago and did not respond to a series of three injections; declines a cortisone injection at this time in favor of trying physical therapy  Soham Arguello reports pain located anterior from the quad tendon to the level of the tibial tuberosity  Since onset, symptoms have stayed the same  Currently, Soham Arguello is having difficulty with sitting (>1 hour), standing from a low surface such as the toilet, pain when getting up from the floor, going up and down stairs, and with increased activity  Patient admits to difficulty sleeping secondary to L knee discomfort when laying supine with the L knee straight  Patient denies numbness, tingling in left leg and left foot  Pain  Current pain ratin  At best pain ratin  At worst pain ratin  Location: L knee  Quality: dull ache  Relieving factors: rest    Social Support  Steps to enter house: yes  2  Stairs in house: yes   Lives in: [place] house (with basement)  Lives with: spouse, adult children and parents    Employment status: working ([employer])    Diagnostic Tests  Abnormal x-ray: 3/4/22: Mild osteoarthritis with narrowing of the medial tibiofemoral joint and small osteophytes  Treatments  Current treatment: physical therapy  Patient Goals  Patient goals for therapy: decreased pain          Objective     Observations   Left Knee   Positive for edema  Negative for adhesive scar, drainage and incision  Palpation   Left   Hypertonic in the rectus femoris and vastus lateralis  Tenderness of the vastus lateralis       Tenderness   Left Knee   No tenderness in the inferior patella, lateral patella, LCL (distal), LCL (proximal), MCL (distal), MCL (proximal), medial joint line, medial patella, patellar tendon, pes anserinus, quadriceps tendon, superior patella and tibial tubercle  Active Range of Motion   Left Knee   Flexion: 140 degrees   Extension: 0 degrees     Right Knee   Flexion: 140 degrees   Extension: 0 degrees     Mobility   Patellar Mobility:   Left Knee   WFL: medial, lateral, superior and inferior  Right Knee   WFL: medial, lateral, superior and inferior    Strength/Myotome Testing     Left Knee   Flexion: 5  Extension: 5    Right Knee   Flexion: 5  Extension: 5    Additional Strength Details  L glute max MMT: 5/5  R glute max MMT: 5/5  L hamstring MMT: 5/5  R hamstring MMT: 5/5      Tests     Left Knee   Positive patellar compression  Negative anterior drawer, lateral Wen, medial Wen, posterior drawer, posterior sag, valgus stress test at 0 degrees, valgus stress test at 30 degrees, varus stress test at 0 degrees and varus stress test at 30 degrees       Additional Tests Details  L Ely's test (+)  L Jonathan test (+)           Precautions: HTN, GERD, DM type II    * Indicates part of HEP   HEP code: [id number]     Daily Treatment Diary     Manuals 3/21 3/31 4/7 4/14 4/28      Quad release   Done L        Patellar mobility   Done L                   Therapeutic Exercise           Bike  nv 5' min L0 5' lvl 2 5' lvl 3 5' lvl 3      Hamstring stretch           Prone quad stretch 10x10" ea 10x10" ea 10x10" ea 10x10" ea 10x10" ea      Piriformis stretch           SLR flexion 2x10 ea 2x10ea 3x10 ea 3x10 ea 3x10 ea 3-way      SLR abduction           Seated figure 4 with downward OP  5" x10                               Patient education done Done- edu on calf/ HS tension         Neuro Re-ed           Bridges nv 3x5 3x10 3x10 3x12x3"      Clamshells nv 2x10ea 2x12 ea 3x10 ea 3x10 ea      Lateral eccentric step down           Forward eccentric step down                                                       Therapeutic Activity           Sit to stands           Leg press nv # 3x10 P9S3 145# 3x10 P9S3 155# 3x10 P8S3      Heel raises   3x10 3x10       Toe raises   3x10 3x10       Band resisted side stepping nv PTB 3 laps         Monster walks           Goblet squat   Tap to chair w/ 2 foam boost 3x10 Tap to chair w/ 2 foam boost 3x10 Tap to chair w/ 2 foam boost 1x10, 1 foam boost 2x10      Deadlift           Forward step ups nv HELD - calf/ hs pain  8" step up/back 2x10 ea       Lateral step ups                                                       Modalities

## 2022-05-05 ENCOUNTER — OFFICE VISIT (OUTPATIENT)
Dept: PHYSICAL THERAPY | Facility: REHABILITATION | Age: 57
End: 2022-05-05
Payer: COMMERCIAL

## 2022-05-05 DIAGNOSIS — M17.12 PRIMARY OSTEOARTHRITIS OF LEFT KNEE: ICD-10-CM

## 2022-05-05 DIAGNOSIS — M22.2X2 PATELLOFEMORAL DISORDER OF LEFT KNEE: Primary | ICD-10-CM

## 2022-05-05 PROCEDURE — 97110 THERAPEUTIC EXERCISES: CPT

## 2022-05-05 PROCEDURE — 97112 NEUROMUSCULAR REEDUCATION: CPT

## 2022-05-05 PROCEDURE — 97530 THERAPEUTIC ACTIVITIES: CPT

## 2022-05-05 NOTE — PROGRESS NOTES
Daily Note     Today's date: 2022  Patient name: Abdirahman Odonnell  : 1965  MRN: [id number]  Referring provider: Jasen Mccord  Dx:   Encounter Diagnosis     ICD-10-CM    1  Patellofemoral disorder of left knee  M22 2X2    2  Primary osteoarthritis of left knee  M17 12        Start Time: 1615  Stop Time: 1700  Total time in clinic (min): 45 minutes    Subjective: Patient reports he is doing good today  Has no pain noted upon arrival of appointment  Objective: See treatment diary below      Assessment: Tolerated treatment well  Initiated fwd and lateral eccentric stepdowns which pt tolerated well without any exacerbations noted  Patient exhibited good technique with therapeutic exercises and would benefit from continued PT  Plan: Continue per plan of care        Precautions: HTN, GERD, DM type II    * Indicates part of HEP   HEP code: [id number]     Daily Treatment Diary     Manuals 3/21 3/31 4/7 4/14 4/28 5/5     Quad release   Done L        Patellar mobility   Done L                   Therapeutic Exercise           Bike  nv 5' min L0 5' lvl 2 5' lvl 3 5' lvl 3 5' lvl 3      Hamstring stretch           Prone quad stretch 10x10" ea 10x10" ea 10x10" ea 10x10" ea 10x10" ea 10x10" ea     Piriformis stretch           SLR flexion 2x10 ea 2x10ea 3x10 ea 3x10 ea 3x10 ea 3-way 3x10 ea   3-way      SLR abduction           Seated figure 4 with downward OP  5" x10                               Patient education done Done- edu on calf/ HS tension         Neuro Re-ed           Bridges nv 3x5 3x10 3x10 3x12x3" 3x12x5"     Clamshells nv 2x10ea 2x12 ea 3x10 ea 3x10 ea OTB 3x10 ea     Lateral eccentric step down      4" step 2x10 ea      Forward eccentric step down      4" step 2x10 ea                                                 Therapeutic Activity           Sit to stands           Leg press nv # 3x10 P9S3 145# 3x10 P9S3 155# 3x10 P8S3 155# 3x10 P8S3     Heel raises   3x10 3x10       Toe raises 3x10 3x10       Band resisted side stepping nv PTB 3 laps         Monster walks           Goblet squat   Tap to chair w/ 2 foam boost 3x10 Tap to chair w/ 2 foam boost 3x10 Tap to chair w/ 2 foam boost 1x10, 1 foam boost 2x10 Tap to chair w/ 2 foam boost 1x10, 1 foam boost 2x10     Deadlift           Forward step ups nv HELD - calf/ hs pain  8" step up/back 2x10 ea  8" step up/back 2x10 ea      Lateral step ups                                                       Modalities

## 2022-05-12 ENCOUNTER — OFFICE VISIT (OUTPATIENT)
Dept: PHYSICAL THERAPY | Facility: REHABILITATION | Age: 57
End: 2022-05-12
Payer: COMMERCIAL

## 2022-05-12 DIAGNOSIS — M17.12 PRIMARY OSTEOARTHRITIS OF LEFT KNEE: ICD-10-CM

## 2022-05-12 DIAGNOSIS — M22.2X2 PATELLOFEMORAL DISORDER OF LEFT KNEE: Primary | ICD-10-CM

## 2022-05-12 PROCEDURE — 97110 THERAPEUTIC EXERCISES: CPT

## 2022-05-12 PROCEDURE — 97112 NEUROMUSCULAR REEDUCATION: CPT

## 2022-05-12 NOTE — PROGRESS NOTES
Daily Note     Today's date: 2022  Patient name: Yelena Francois  : 1965  MRN: [id number]  Referring provider: Frederick Gonsalez  Dx:   Encounter Diagnosis     ICD-10-CM    1  Patellofemoral disorder of left knee  M22 2X2    2  Primary osteoarthritis of left knee  M17 12                   Subjective: Patient reports he is doing good today  Has no pain noted upon arrival of appointment  Objective: See treatment diary below      Assessment: Tolerated treatment well with no increase in pain and min to moderate fatigue  Pt require VC with lateral taps downs to decrease valgus stressors and emphasize glute activation  Pt continues to be challenged with goblet squats  Patient would benefit from continued PT  Plan: Continue per plan of care        Precautions: HTN, GERD, DM type II    * Indicates part of HEP   HEP code: [id number]     Daily Treatment Diary     Manuals      Quad release        Patellar mobility                Therapeutic Exercise        Bike  5' lvl 3 5' lvl 3 5' lvl 3  5' lvl 3     Hamstring stretch        Prone quad stretch 10x10" ea 10x10" ea 10x10" ea 10 x10"     Piriformis stretch        SLR flexion 3x10 ea 3x10 ea 3-way 3x10 ea   3-way  3 x 10 ea 3-way    SLR abduction        Seated figure 4 with downward OP                        Patient education        Neuro Re-ed        Bridges 3x10 3x12x3" 3x12x5" 3 x 12 x 5"     Clamshells 3x10 ea 3x10 ea OTB 3x10 ea OTB 3 x 10 ea     Lateral eccentric step down   4" step 2x10 ea  4" step 2 x 10 ea     Forward eccentric step down   4" step 2x10 ea 4" step 2 x 10                                     Therapeutic Activity        Sit to stands        Leg press 145# 3x10 P9S3 155# 3x10 P8S3 155# 3x10 P8S3 155# 3x10 P8S3    Heel raises 3x10       Toe raises 3x10       Band resisted side stepping        Monster walks        Goblet squat Tap to chair w/ 2 foam boost 3x10 Tap to chair w/ 2 foam boost 1x10, 1 foam boost 2x10 Tap to chair w/ 2 foam boost 1x10, 1 foam boost 2x10 Tap to chair w/ 2 foam boost 1x10, 1 foam boost 2x10      Deadlift        Forward step ups 8" step up/back 2x10 ea  8" step up/back 2x10 ea  8" step up/back 2x10 ea    Lateral step ups                                        Modalities

## 2022-05-19 ENCOUNTER — EVALUATION (OUTPATIENT)
Dept: PHYSICAL THERAPY | Facility: REHABILITATION | Age: 57
End: 2022-05-19
Payer: COMMERCIAL

## 2022-05-19 DIAGNOSIS — M17.12 PRIMARY OSTEOARTHRITIS OF LEFT KNEE: ICD-10-CM

## 2022-05-19 DIAGNOSIS — M22.2X2 PATELLOFEMORAL DISORDER OF LEFT KNEE: Primary | ICD-10-CM

## 2022-05-19 PROCEDURE — 97112 NEUROMUSCULAR REEDUCATION: CPT | Performed by: PHYSICAL THERAPIST

## 2022-05-19 PROCEDURE — 97530 THERAPEUTIC ACTIVITIES: CPT | Performed by: PHYSICAL THERAPIST

## 2022-05-19 PROCEDURE — 97110 THERAPEUTIC EXERCISES: CPT | Performed by: PHYSICAL THERAPIST

## 2022-05-19 NOTE — PROGRESS NOTES
PT Re-Evaluation  and PT Discharge    Today's date: 2022  Patient name: Keturah Bell  : 1965  MRN: [id number]  Referring provider: Edel Waldron  Dx:   Encounter Diagnosis     ICD-10-CM    1  Patellofemoral disorder of left knee  M22 2X2    2  Primary osteoarthritis of left knee  M17 12        Start Time: 1615  Stop Time: 1708  Total time in clinic (min): 53 minutes    Assessment  Assessment details: Per patient report and clinical findings, Keturah Bell has made good progress since starting skilled physical therapy services  To this date, Keturah Bell has completed 8 visits of skilled physical therapy  Noted improvements include improved ROM, increased LE strength, improved activity and positional tolerance, and decreased pain frequency and intensity  After discussion and mutual agreement with patient, recommend discharge to independent HEP at this time secondary to continued good status regarding the L knee, return to PLOF, and achieving goals set at IE  Updated and reviewed HEP with patient; patient verbalized and demonstrated understanding of all exercises  Brandee Garcia is currently scheduled for a series of 3 injections for the L knee  Discussed returning after injection series to address new onset of R knee pain if symptoms persist      Understanding of Dx/Px/POC: good   Prognosis: good    Goals  STG (3 weeks):  1  L knee flexion ROM equal to R to promote improved activity tolerance  - met  2  Increase L knee extension strength to at least 5/5 for improved activity tolerance  - met  3  Decrease pain at worst from 5/10 to 3/10 or less for improved QoL  - met    LTG (6 weeks):  1  Increased global knee strength to 5/5 to promote improved activity tolerance  - met  2  Able to stand from a low surface without symptom exacerbation to promote ability get on/off toilet  - met  3  Able to sit for at least 2 hours without symptom exacerbation to promote improved positional  - met  4   Able to lay supine without L knee pain to promote improved sleep quality  - met  5  Able to ascend/descend 1 flight of stairs without compensation to promote improved household and community ambulation  - met  6  Independent with HEP  - met      Plan  Plan details: Thank you for referring Dillon Phelps to Physical Therapy at [place] [address] and for the opportunity to coordinate care  Planned therapy interventions: home exercise program  Frequency: 1x week  Duration in visits: 1  Plan of Care beginning date: 2022  Treatment plan discussed with: patient        Subjective Evaluation    History of Present Illness  Mechanism of injury:   22:  Dillon Phelps reports feeling he has made 100% progress regarding L knee pain since IE and receiving injection in the knee  Jack Washington reports improvements in knee pain when standing<>sitting  Does report that his R knee started to bother 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   Notes R knee pain around medial to inferior patella and describes pain as sharp       22:  Dillon Phelps reports feeling he has made 90% progress since IE  States he received an injection in the knee on 4/15/22 and will likely be schedule in May  Notes improvement in the daily ache in the knee since receiving the injection  Reports improvement in ability to lay supine, sitting tolerance, going up/down stairs, getting up/down from the floor  Does continue to note pain in the knee when standing up from a low surface, but has improved somewhat      22:  Dillon Phelps presents to skilled physical therapy with complaints of chronic L knee pain  Jack Washington reports he had L knee pain about 5 years and responded well to a cortisone injection  States the L knee pain began about 5-6 months ago and did not respond to a series of three injections; declines a cortisone injection at this time in favor of trying physical therapy   Jack Washington reports pain located anterior from the quad tendon to the level of the tibial tuberosity  Since onset, symptoms have stayed the same  Currently, Juan Carlos Poole is having difficulty with sitting (>1 hour), standing from a low surface such as the toilet, pain when getting up from the floor, going up and down stairs, and with increased activity  Patient admits to difficulty sleeping secondary to L knee discomfort when laying supine with the L knee straight  Patient denies numbness, tingling in left leg and left foot  Pain  Current pain ratin  At best pain ratin  At worst pain ratin  Location: L knee  Quality: dull ache  Relieving factors: rest    Social Support  Steps to enter house: yes  2  Stairs in house: yes   Lives in: [place] (with basement)  Lives with: spouse, adult children and parents    Employment status: working ([employer]    Diagnostic Tests  Abnormal x-ray: 3/4/22: Mild osteoarthritis with narrowing of the medial tibiofemoral joint and small osteophytes  Treatments  Current treatment: physical therapy  Patient Goals  Patient goals for therapy: decreased pain          Objective     Observations   Left Knee   Positive for edema  Negative for adhesive scar, drainage and incision  Palpation   Left   Hypertonic in the rectus femoris and vastus lateralis  Tenderness of the vastus lateralis  Tenderness   Left Knee   No tenderness in the inferior patella, lateral patella, LCL (distal), LCL (proximal), MCL (distal), MCL (proximal), medial joint line, medial patella, patellar tendon, pes anserinus, quadriceps tendon, superior patella and tibial tubercle  Active Range of Motion   Left Knee   Flexion: 140 degrees   Extension: 0 degrees     Right Knee   Flexion: 140 degrees   Extension: 0 degrees     Mobility   Patellar Mobility:   Left Knee   WFL: medial, lateral, superior and inferior       Right Knee   WFL: medial, lateral, superior and inferior    Strength/Myotome Testing     Left Knee   Flexion: 5  Extension: 5    Right Knee   Flexion: 5  Extension: 5    Additional Strength Details  L glute max MMT: 5/5  R glute max MMT: 5/5  L hamstring MMT: 5/5  R hamstring MMT: 5/5      Tests     Left Knee   Positive patellar compression  Negative anterior drawer, lateral Wen, medial Wen, posterior drawer, posterior sag, valgus stress test at 0 degrees, valgus stress test at 30 degrees, varus stress test at 0 degrees and varus stress test at 30 degrees       Additional Tests Details  L Ely's test (+)  L Jonathan test (+)      Flowsheet Rows    Flowsheet Row Most Recent Value   PT/OT G-Codes    Current Score 99   Projected Score 64           Precautions: HTN, GERD, DM type II    * Indicates part of HEP   HEP code: [id number]     Daily Treatment Diary     Manuals 4/14 4/28 5/5 5/12 5/19     Quad release          Patellar mobility                    Therapeutic Exercise          Bike  5' lvl 3 5' lvl 3 5' lvl 3  5' lvl 3  5' lvl 1     Hamstring stretch          Prone quad stretch* 10x10" ea 10x10" ea 10x10" ea 10 x10"  2'x1 ea     Piriformis stretch          SLR flexion* 3x10 ea 3x10 ea 3-way 3x10 ea   3-way  3x10 ea 3-way 3x10 ea 3-way     SLR abduction                                        Patient education          Neuro Re-ed          Bridges* 3x10 3x12x3" 3x12x5" 3 x 12 x 5"  3x15x5"     Clamshells* 3x10 ea 3x10 ea OTB 3x10 ea OTB 3 x 10 ea  OTB 3x10 ea     Lateral eccentric step down   4" step 2x10 ea  4" step 2 x 10 ea  4" step 2x10     Forward eccentric step down   4" step 2x10 ea 4" step 2 x 10  4" step 2x10                                             Therapeutic Activity          Sit to stands          Leg press 145# 3x10 P9S3 155# 3x10 P8S3 155# 3x10 P8S3 155# 3x10 P8S3 155# 3x12 P8S3     Heel raises 3x10         Toe raises 3x10         Band resisted side stepping          Monster walks          Goblet squat Tap to chair w/ 2 foam boost 3x10 Tap to chair w/ 2 foam boost 1x10, 1 foam boost 2x10 Tap to chair w/ 2 foam boost 1x10, 1 foam boost 2x10 Tap to chair w/ 2 foam boost 1x10, 1 foam boost 2x10        Deadlift          Forward step ups 8" step up/back 2x10 ea  8" step up/back 2x10 ea  8" step up/back 2x10 ea 8" step up/back 2x10 ea     Lateral step ups                                                  Modalities

## 2022-05-24 ENCOUNTER — HOSPITAL ENCOUNTER (EMERGENCY)
Facility: HOSPITAL | Age: 57
Discharge: HOME | End: 2022-05-24
Attending: EMERGENCY MEDICINE | Admitting: EMERGENCY MEDICINE
Payer: OTHER MISCELLANEOUS

## 2022-05-24 ENCOUNTER — APPOINTMENT (EMERGENCY)
Dept: RADIOLOGY | Facility: HOSPITAL | Age: 57
End: 2022-05-24
Payer: OTHER MISCELLANEOUS

## 2022-05-24 VITALS
TEMPERATURE: 98.2 F | SYSTOLIC BLOOD PRESSURE: 141 MMHG | DIASTOLIC BLOOD PRESSURE: 83 MMHG | BODY MASS INDEX: 39.32 KG/M2 | RESPIRATION RATE: 18 BRPM | HEART RATE: 79 BPM | WEIGHT: 315 LBS | OXYGEN SATURATION: 100 %

## 2022-05-24 DIAGNOSIS — S82.409A FIBULA FRACTURE: Primary | ICD-10-CM

## 2022-05-24 PROCEDURE — 99284 EMERGENCY DEPT VISIT MOD MDM: CPT | Performed by: PHYSICIAN ASSISTANT

## 2022-05-24 PROCEDURE — 73610 X-RAY EXAM OF ANKLE: CPT

## 2022-05-24 PROCEDURE — 99283 EMERGENCY DEPT VISIT LOW MDM: CPT

## 2022-05-24 RX ORDER — IBUPROFEN 600 MG/1
600 TABLET ORAL ONCE
Status: COMPLETED | OUTPATIENT
Start: 2022-05-24 | End: 2022-05-24

## 2022-05-24 RX ORDER — OXYCODONE HYDROCHLORIDE AND ACETAMINOPHEN 5; 325 MG/1; MG/1
1 TABLET ORAL ONCE
Status: DISCONTINUED | OUTPATIENT
Start: 2022-05-24 | End: 2022-05-24 | Stop reason: HOSPADM

## 2022-05-24 RX ADMIN — IBUPROFEN 600 MG: 600 TABLET ORAL at 16:19

## 2022-05-24 NOTE — ED NOTES
Discharge instructions reviewed with pt  Pt verbalized understanding  And has no further questions at this time        Juan Estrada RN  05/24/22 3716

## 2022-05-24 NOTE — Clinical Note
Devon Jason was seen and treated in our emergency department on 5/24/2022  Other - See Comments        Diagnosis:     [place]    He may return on this date:     NO work until cleared by orthopedist     If you have any questions or concerns, please don't hesitate to call        Yaniv Espinosa PA-C    ______________________________           _______________          _______________  Hospital Representative                              Date                                Time

## 2022-05-24 NOTE — DISCHARGE INSTRUCTIONS
Use Splint and crutches for next few days for comfort, taking off to bathe or sleep or until follow-up with orthopedic doctor  Use Tylenol 650 mg every 4 hours or Anti-inflammatories like Advil, Motrin, Ibuprofen, Aleve every 6 hours; you can alternate the 2 medications taking something every 3 hours for pain  Elevate and Ice to area to help with pain  Follow-up with orthopedic doctor in the next 1-2 days

## 2022-05-24 NOTE — ED PROVIDER NOTES
History  Chief Complaint   Patient presents with    Ankle Injury     Pt states he injured his L ankle by rolling it today while he was walking  Pt with PMH: Maciel's esophagus, Diabetes mellitus, Esophageal polyp, GERD, Hypertension   No pertinent PSH  Presents to ED c/o left ankle pain/swelling that pt sustained about 4 hours PTA when he was walking on uneven pavement and "rolled" left ankle  Patient did not fall, has been able to bear some weight, with difficulty; and has had worsening pain since  Patient did not take anything for symptoms prior to arrival     Patient has no other injuries or complaints  Prior to Admission Medications   Prescriptions Last Dose Informant Patient Reported? Taking?    Multiple Vitamins-Minerals (multivitamin with minerals) tablet  Self Yes No   Sig: Take 1 tablet by mouth daily   Ozempic, 0 25 or 0 5 MG/DOSE, 2 MG/1 5ML SOPN  Self Yes No   Sig: USE AS DIRECTED- 0 5MG ONCE WEEKLY   Semaglutide (OZEMPIC, 0 25 OR 0 5 MG/DOSE, SC)  Self Yes No   atorvastatin (LIPITOR) 40 mg tablet  Self Yes No   fluticasone-vilanterol (BREO ELLIPTA) 200-25 MCG/INH inhaler  Self Yes No   hydrochlorothiazide (HYDRODIURIL) 25 mg tablet  Self Yes No   Sig: Take 25 mg by mouth daily   losartan (COZAAR) 100 MG tablet  Self Yes No   Sig: Take 100 mg by mouth daily   meloxicam (Mobic) 15 mg tablet   No No   Sig: Take 1 tablet (15 mg total) by mouth daily As needed   metFORMIN (GLUCOPHAGE) 1000 MG tablet  Self Yes No   Sig: Take 1,000 mg by mouth 2 (two) times a day   nystatin (MYCOSTATIN) 500,000 units/5 mL suspension  Self No No   Sig: Apply 5 mL (500,000 Units total) to the mouth or throat 4 (four) times a day   Patient not taking: Reported on 3/21/2022    pantoprazole (PROTONIX) 20 mg tablet   No No   Sig: Take 1 tablet (20 mg total) by mouth daily before breakfast TAKE ONE TABLET "ONE-HALF"-HOUR BEFORE BREAKFAST AND IN THE EVENING      Facility-Administered Medications: None       Past Medical History:   Diagnosis Date    Maciel's esophagus     Colon polyp     Cough 06/11/2021    had PFT due to 30 year plus smoker - uses Breo Inhaler for maintenance    Diabetes mellitus ([facility] [address] )     type 2, accu check 113 at 0700 on 6/11/21    Esophageal polyp 06/11/2021    GERD (gastroesophageal reflux disease)     Hypertension        Past Surgical History:   Procedure Laterality Date    COLONOSCOPY      UPPER GASTROINTESTINAL ENDOSCOPY         History reviewed  No pertinent family history  I have reviewed and agree with the history as documented  E-Cigarette/Vaping    E-Cigarette Use Never User      E-Cigarette/Vaping Substances    Nicotine No     THC No     CBD No     Flavoring No     Other No     Unknown No      Social History     Tobacco Use    Smoking status: Current Every Day Smoker     Packs/day: 2 00     Years: 36 00     Pack years: 72 00     Types: Cigarettes     Start date: 3/10/1984    Smokeless tobacco: Never Used   Vaping Use    Vaping Use: Never used   Substance Use Topics    Alcohol use: Yes     Comment: occ    Drug use: Never       Review of Systems   Constitutional: Negative for chills and fever  HENT: Negative for hearing loss, sore throat and trouble swallowing  Eyes: Negative for visual disturbance  Respiratory: Negative for cough and shortness of breath  Cardiovascular: Negative for chest pain  Gastrointestinal: Negative for vomiting  Musculoskeletal: Positive for arthralgias and joint swelling  Negative for myalgias  Skin: Negative for color change and pallor  Neurological: Negative for weakness and numbness  All other systems reviewed and are negative  Physical Exam  Physical Exam  Vitals and nursing note reviewed  Constitutional:       General: He is in acute distress  Appearance: He is well-developed  He is obese  HENT:      Head: Normocephalic and atraumatic        Right Ear: External ear normal       Left Ear: External ear normal  Nose: Nose normal       Mouth/Throat:      Mouth: Mucous membranes are moist       Pharynx: Oropharynx is clear  Eyes:      Conjunctiva/sclera: Conjunctivae normal    Cardiovascular:      Rate and Rhythm: Normal rate  Pulmonary:      Effort: Pulmonary effort is normal  No respiratory distress  Musculoskeletal:         General: Swelling, tenderness and signs of injury present  No deformity  Normal range of motion  Cervical back: Normal range of motion  Comments: Pain, tenderness noted along lateral malleolus , no pain medially, along 5th metatarsal, toes, proximal lower extremity   Skin:     General: Skin is warm and dry  Neurological:      Mental Status: He is alert and oriented to person, place, and time  Psychiatric:         Behavior: Behavior normal          Vital Signs  ED Triage Vitals [05/24/22 1531]   Temperature Pulse Respirations Blood Pressure SpO2   98 2 °F (36 8 °C) 79 18 141/83 100 %      Temp Source Heart Rate Source Patient Position - Orthostatic VS BP Location FiO2 (%)   Oral -- Lying Right arm --      Pain Score       6           Vitals:    05/24/22 1531   BP: 141/83   Pulse: 79   Patient Position - Orthostatic VS: Lying         Visual Acuity      ED Medications  Medications   oxyCODONE-acetaminophen (PERCOCET) 5-325 mg per tablet 1 tablet (has no administration in time range)   ibuprofen (MOTRIN) tablet 600 mg (600 mg Oral Given 5/24/22 1619)       Diagnostic Studies  Results Reviewed     None                 XR ankle 3+ views LEFT   ED Interpretation by Deepa Mack PA-C (05/24 1645)   + distal fib fx      Final Result by Alexey Aguilar MD (05/24 1646)      Avulsion fracture distal lateral malleolus with adjacent soft tissue swelling              Workstation performed: [id number]                    Procedures  Splint application    Date/Time: 5/24/2022 4:54 PM  Performed by: Deepa Mack PA-C  Authorized by: Deepa Mack PA-C   Universal Protocol:  Procedure performed by: (splint placed by nursing staff)  Consent: Verbal consent obtained  Risks and benefits: risks, benefits and alternatives were discussed  Consent given by: patient  Time out: Immediately prior to procedure a "time out" was called to verify the correct patient, procedure, equipment, support staff and site/side marked as required  Patient understanding: patient states understanding of the procedure being performed  Patient identity confirmed: verbally with patient      Pre-procedure details:     Sensation:  Normal    Skin color:  Normal  Procedure details:     Laterality:  Left    Location:  Ankle    Ankle:  L ankle    Splint type:  Short leg    Supplies:  Ortho-Glass and elastic bandage  Post-procedure details:     Pain:  Improved    Sensation:  Normal    Patient tolerance of procedure: Tolerated well, no immediate complications  Comments:      Crutches             ED Course                               SBIRT 22yo+    Flowsheet Row Most Recent Value   SBIRT (25 yo +)    In order to provide better care to our patients, we are screening all of our patients for alcohol and drug use  Would it be okay to ask you these screening questions? No Filed at: 05/24/2022 1630                    MDM    Disposition  Final diagnoses:   Fibula fracture     Time reflects when diagnosis was documented in both MDM as applicable and the Disposition within this note     Time User Action Codes Description Comment    5/24/2022  4:57 PM Filomena Coma Add [R19 127I] Fibula fracture       ED Disposition     ED Disposition   Discharge    Condition   Stable    Date/Time   Tue May 24, 2022  4:57 PM    Comment   Coreen Castaneda discharge to home/self care                 Follow-up Information     Follow up With Specialties Details Why Contact Info Additional [facility]   [address] [facility], [address] [facility] [phone]    workman's compensation orthopedist               Patient's Medications   Discharge Prescriptions    No medications on file       No discharge procedures on file      PDMP Review     None          ED Provider  Electronically Signed by           Mary Martinez PA-C  05/24/22 3901

## 2022-05-25 ENCOUNTER — TELEPHONE (OUTPATIENT)
Dept: OBGYN CLINIC | Facility: MEDICAL CENTER | Age: 57
End: 2022-05-25

## 2022-05-25 NOTE — TELEPHONE ENCOUNTER
Spoke to patient  He stated he would rather have the ankle addressed first since he has to work  Did reschedule tomorrow's visco injection for the new issue L ankle  Advised patient that this does put a lot of new issues/patients in a row  If there is an issue with the time of the appointment or it needs to be changed we will let him know  Added the 3rd visco injection to the end with Daniel Curry on 6/15  Please advise if tomorrow's appointment is okay as scheduled

## 2022-05-25 NOTE — TELEPHONE ENCOUNTER
After speaking with provider and reviewing further pt's chart, it was best to have him come in tomorrow for visco #1  Let the swelling of ankle go down and will be evaluated for ankle injury at next visco apt next week  Called patient and explain, pt understood and would like a work note written  Will  at visit tomorrow 5/26

## 2022-05-25 NOTE — TELEPHONE ENCOUNTER
Patient sees Dr Peter Abraham   Patient is calling in to let Dr know he had just fractured his left fibula  He was in the ED  Yesterday and he has 3 visco injection appointments for next 3 weeks in same leg and first appointment is tomorrow  He does not know if he can get the injections due to his leg fracture? He is asking for a call back relating this and what he should be doing       #  [phone]  -                         -

## 2022-05-26 ENCOUNTER — PROCEDURE VISIT (OUTPATIENT)
Dept: OBGYN CLINIC | Facility: CLINIC | Age: 57
End: 2022-05-26
Payer: COMMERCIAL

## 2022-05-26 ENCOUNTER — TELEPHONE (OUTPATIENT)
Dept: OBGYN CLINIC | Facility: HOSPITAL | Age: 57
End: 2022-05-26

## 2022-05-26 ENCOUNTER — APPOINTMENT (OUTPATIENT)
Dept: PHYSICAL THERAPY | Facility: REHABILITATION | Age: 57
End: 2022-05-26
Payer: COMMERCIAL

## 2022-05-26 VITALS — WEIGHT: 315 LBS | BODY MASS INDEX: 38.36 KG/M2 | HEIGHT: 76 IN

## 2022-05-26 DIAGNOSIS — M17.12 ARTHRITIS OF KNEE, LEFT: Primary | ICD-10-CM

## 2022-05-26 PROCEDURE — 20610 DRAIN/INJ JOINT/BURSA W/O US: CPT | Performed by: ORTHOPAEDIC SURGERY

## 2022-05-26 RX ORDER — HYALURONATE SODIUM 10 MG/ML
20 SYRINGE (ML) INTRAARTICULAR
Status: COMPLETED | OUTPATIENT
Start: 2022-05-26 | End: 2022-05-26

## 2022-05-26 RX ADMIN — Medication 20 MG: at 08:33

## 2022-05-26 NOTE — TELEPHONE ENCOUNTER
First phone number is busy, cell phone number not identified so generic ask for return call VM left of that number

## 2022-05-26 NOTE — PROGRESS NOTES
1  Arthritis of knee, left       Patient is here for his  1st injection of  Euflexxa into the left knee  Patient reports pain  All organ systems normal  Physical exam of the knee shows no effusion no ecchymosis  Large joint arthrocentesis: L knee  Universal Protocol:  Consent given by: patient  Time out: Immediately prior to procedure a "time out" was called to verify the correct patient, procedure, equipment, support staff and site/side marked as required    Supporting Documentation  Indications: pain   Procedure Details  Location: knee - L knee  Needle size: 22 G  Ultrasound guidance: no  Approach: anterolateral  Medications administered: 20 mg Sodium Hyaluronate 20 MG/2ML    Patient tolerance: patient tolerated the procedure well with no immediate complications          Patient tolerated procedure follow up  1 week

## 2022-05-26 NOTE — TELEPHONE ENCOUNTER
Nurse  called back in regards to this  Made her aware of message above   She understood and had no further questions or concerns at this time

## 2022-05-26 NOTE — TELEPHONE ENCOUNTER
Tracy Munguia, NCM [facility], [place] [phone], Ph [phone], Cell [phone]    Claim Tabtaha Prieto [id number]  DOI 05/24/2022    Pasquale Small is calling to find out if the left ankle injury was addressed today  Pasquale Small states the patient advised he was given an air cast   Pasquale Small needs a work status pertianing to the left ankle injury  Work status note indicates the left ankle will be addressed next week      Work status faxed to [phone], as requested

## 2022-06-02 ENCOUNTER — OFFICE VISIT (OUTPATIENT)
Dept: OBGYN CLINIC | Facility: CLINIC | Age: 57
End: 2022-06-02
Payer: OTHER MISCELLANEOUS

## 2022-06-02 VITALS — HEIGHT: 76 IN | WEIGHT: 315 LBS | BODY MASS INDEX: 38.36 KG/M2

## 2022-06-02 DIAGNOSIS — S82.839A AVULSION FRACTURE OF DISTAL END OF FIBULA: ICD-10-CM

## 2022-06-02 DIAGNOSIS — M17.12 PRIMARY OSTEOARTHRITIS OF LEFT KNEE: Primary | ICD-10-CM

## 2022-06-02 PROCEDURE — 20610 DRAIN/INJ JOINT/BURSA W/O US: CPT | Performed by: ORTHOPAEDIC SURGERY

## 2022-06-02 PROCEDURE — 99213 OFFICE O/P EST LOW 20 MIN: CPT | Performed by: ORTHOPAEDIC SURGERY

## 2022-06-02 RX ORDER — HYALURONATE SODIUM 10 MG/ML
20 SYRINGE (ML) INTRAARTICULAR
Status: COMPLETED | OUTPATIENT
Start: 2022-06-02 | End: 2022-06-02

## 2022-06-02 RX ADMIN — Medication 20 MG: at 08:36

## 2022-06-02 NOTE — LETTER
June 2, 2022     Patient: Antonio Figueroa  YOB: 1965  Date of Visit: 6/2/2022      To Whom it May Concern:    Mckenna Marbin is under my professional care  Shakiraamanda Arguello was seen in my office on 6/2/2022  Soham Arguello may return to work on 6/6/2022  Sedentary duty for the next 4 weeks, untill follow up in my office       If you have any questions or concerns, please don't hesitate to call           Sincerely,          Robby Steinberg DO        CC: No Recipients

## 2022-06-02 NOTE — PROGRESS NOTES
Assessment:  1  Primary osteoarthritis of left knee  Large joint arthrocentesis: L knee   2  Avulsion fracture of distal end of fibula  Cam Boot     Patient Active Problem List   Diagnosis    Acute bronchitis    Personal history of tobacco use    Simple chronic bronchitis (HCC)    Gastroesophageal reflux disease with esophagitis without hemorrhage    Redundant colon    Esophageal polyp    Hx of adenomatous colonic polyps    Maciel's esophagus without dysplasia    Overweight    Arthritis of knee, left           Plan      56 y o  male with left distal fibula avulsion fracture and left knee osteoarthritis  - Patient was given a cam boot  Weight bearing as tolerated  He will follow up in 4 weeks regarding his ankle  Proprioceptive test at that time, likely discontinue cam boot at that time  - 2nd Euflexxa injection to left knee given, procedure tolerated well  Post injection protocol advised  Follow up in 1 week for final Euflexxa injection left knee  Subjective:     Patient ID:    Chief Complaint:Calvin RAFAEL Freire Spina 56 y o  male left ankle distal fibula avulsion fracture  Patient was seen in ED 5/24/2022  He has been in a splint since, ambulating with the assistance of crutches  He states his pain and swelling have improved  He rates his pain today a 5/10 at its worst   He notes taking motrin 2x a day for pain management  Patient is also here in regards to left knee pain  Patient has osteoarthritis for which he will receive his second Euflexxa injection today  HPI    Patient comes in today with regards to left ankle pain  The patient reports that the pain is in the lateral malleolus and has been going on for 2 weeks  The pain is rated at1 at its best and5 at its worst   The pain is described as sharp  It is worsened with weight bearing, and is made better with rest   The patient has taken motrin for treatment        The following portions of the patient's history were reviewed and updated as appropriate: allergies, current medications, past family history, past social history, past surgical history and problem list         Social History     Socioeconomic History    Marital status: /Civil Union     Spouse name: Not on file    Number of children: Not on file    Years of education: Not on file    Highest education level: Not on file   Occupational History    Not on file   Tobacco Use    Smoking status: Current Every Day Smoker     Packs/day: 2 00     Years: 36 00     Pack years: 72 00     Types: Cigarettes     Start date: 3/10/1984    Smokeless tobacco: Never Used   Vaping Use    Vaping Use: Never used   Substance and Sexual Activity    Alcohol use: Yes     Comment: occ    Drug use: Never    Sexual activity: Not on file   Other Topics Concern    Not on file   Social History Narrative    Not on file     Social Determinants of Health     Financial Resource Strain: Not on file   Food Insecurity: Not on file   Transportation Needs: Not on file   Physical Activity: Not on file   Stress: Not on file   Social Connections: Not on file   Intimate Partner Violence: Not on file   Housing Stability: Not on file     Past Medical History:   Diagnosis Date    Maciel's esophagus     Colon polyp     Cough 06/11/2021    had PFT due to 30 year plus smoker - uses Breo Inhaler for maintenance    Diabetes mellitus ([facility] [address] )     type 2, accu check 113 at 0700 on 6/11/21    Esophageal polyp 06/11/2021    GERD (gastroesophageal reflux disease)     Hypertension      Past Surgical History:   Procedure Laterality Date    COLONOSCOPY      UPPER GASTROINTESTINAL ENDOSCOPY       No Known Allergies  Current Outpatient Medications on File Prior to Visit   Medication Sig Dispense Refill    atorvastatin (LIPITOR) 40 mg tablet       fluticasone-vilanterol (BREO ELLIPTA) 200-25 MCG/INH inhaler       hydrochlorothiazide (HYDRODIURIL) 25 mg tablet Take 25 mg by mouth daily      losartan (COZAAR) 100 MG tablet Take 100 mg by mouth daily      meloxicam (Mobic) 15 mg tablet Take 1 tablet (15 mg total) by mouth daily As needed 30 tablet 1    metFORMIN (GLUCOPHAGE) 1000 MG tablet Take 1,000 mg by mouth 2 (two) times a day      Multiple Vitamins-Minerals (multivitamin with minerals) tablet Take 1 tablet by mouth daily      nystatin (MYCOSTATIN) 500,000 units/5 mL suspension Apply 5 mL (500,000 Units total) to the mouth or throat 4 (four) times a day (Patient not taking: Reported on 3/21/2022 ) 150 mL 0    Ozempic, 0 25 or 0 5 MG/DOSE, 2 MG/1 5ML SOPN USE AS DIRECTED- 0 5MG ONCE WEEKLY      pantoprazole (PROTONIX) 20 mg tablet Take 1 tablet (20 mg total) by mouth daily before breakfast TAKE ONE TABLET "ONE-HALF"-HOUR BEFORE BREAKFAST AND IN THE EVENING 90 tablet 0    Semaglutide (OZEMPIC, 0 25 OR 0 5 MG/DOSE, SC)        No current facility-administered medications on file prior to visit  Objective:    Review of Systems   Constitutional: Negative for chills and fever  HENT: Negative for ear pain and sore throat  Eyes: Negative for pain and visual disturbance  Respiratory: Negative for cough and shortness of breath  Cardiovascular: Negative for chest pain and palpitations  Gastrointestinal: Negative for abdominal pain and vomiting  Genitourinary: Negative for dysuria and hematuria  Musculoskeletal: Negative for arthralgias and back pain  Skin: Negative for color change and rash  Neurological: Negative for seizures and syncope  All other systems reviewed and are negative  Left Ankle Exam     Tenderness   The patient is experiencing tenderness in the lateral malleolus     Swelling: mild    Range of Motion   Dorsiflexion: 5   Plantar flexion: 35   Eversion: 5   Inversion: 15     Muscle Strength   Dorsiflexion:  5/5   Plantar flexion:  5/5   Anterior tibial:  5/5   Gastrocsoleus:  5/5  Peroneal muscle:  5/5    Tests   Anterior drawer: positive    Other   Erythema: absent  Sensation: normal    Comments:  Swelling and edema noted in posterior ankle     Tender to palpation over fibula and calcaneal fibular ligament    No tenderness over 5th MT or peroneal tendon            Physical Exam  Constitutional:       Appearance: Normal appearance  Eyes:      Pupils: Pupils are equal, round, and reactive to light  Cardiovascular:      Pulses: Normal pulses  Musculoskeletal:      Comments: Patient is using crutches today for ambulation   Neurological:      Mental Status: He is alert  Large joint arthrocentesis: L knee  Universal Protocol:  Consent: Verbal consent obtained  Risks and benefits: risks, benefits and alternatives were discussed  Consent given by: patient  Patient understanding: patient states understanding of the procedure being performed  Patient identity confirmed: verbally with patient    Supporting Documentation  Indications: pain and diagnostic evaluation   Procedure Details  Location: knee - L knee  Preparation: Patient was prepped and draped in the usual sterile fashion  Needle size: 22 G  Ultrasound guidance: no  Medications administered: 20 mg Sodium Hyaluronate 20 MG/2ML    Patient tolerance: patient tolerated the procedure well with no immediate complications  Dressing:  Sterile dressing applied             I have personally reviewed pertinent films in PACS  Scribe Attestation    I,:  Javier Salgado am acting as a scribe while in the presence of the attending physician :       I,:  Marcio Benz DO personally performed the services described in this documentation    as scribed in my presence :               Portions of the record may have been created with voice recognition software   Occasional wrong word or "sound a like" substitutions may have occurred due to the inherent limitations of voice recognition software   Read the chart carefully and recognize, using context, where substitutions have occurred

## 2022-06-06 ENCOUNTER — TELEPHONE (OUTPATIENT)
Dept: OBGYN CLINIC | Facility: HOSPITAL | Age: 57
End: 2022-06-06

## 2022-06-08 ENCOUNTER — OFFICE VISIT (OUTPATIENT)
Dept: GASTROENTEROLOGY | Facility: CLINIC | Age: 57
End: 2022-06-08
Payer: COMMERCIAL

## 2022-06-08 VITALS
DIASTOLIC BLOOD PRESSURE: 77 MMHG | BODY MASS INDEX: 38.36 KG/M2 | HEART RATE: 86 BPM | HEIGHT: 76 IN | WEIGHT: 315 LBS | SYSTOLIC BLOOD PRESSURE: 137 MMHG

## 2022-06-08 DIAGNOSIS — K22.81 ESOPHAGEAL POLYP: ICD-10-CM

## 2022-06-08 DIAGNOSIS — K22.70 BARRETT'S ESOPHAGUS WITHOUT DYSPLASIA: ICD-10-CM

## 2022-06-08 DIAGNOSIS — Z86.010 HX OF ADENOMATOUS COLONIC POLYPS: ICD-10-CM

## 2022-06-08 DIAGNOSIS — K21.00 GASTROESOPHAGEAL REFLUX DISEASE WITH ESOPHAGITIS WITHOUT HEMORRHAGE: Primary | ICD-10-CM

## 2022-06-08 PROCEDURE — 99213 OFFICE O/P EST LOW 20 MIN: CPT | Performed by: NURSE PRACTITIONER

## 2022-06-08 RX ORDER — RIVAROXABAN 10 MG/1
TABLET, FILM COATED ORAL
COMMUNITY
Start: 2022-06-03

## 2022-06-08 RX ORDER — OXYCODONE HYDROCHLORIDE 5 MG/1
TABLET ORAL
COMMUNITY
Start: 2022-06-03

## 2022-06-08 NOTE — PROGRESS NOTES
[facility] - Outpatient Follow-up Note  Juan Carlos Hawley 56 y o  male MRN: [id number]  Encounter: [id number]          ASSESSMENT AND PLAN:      1  Gastroesophageal reflux disease with esophagitis without hemorrhage  2  Maciel's esophagus without dysplasia  Last EGD was in June 2021 and Maciel's esophagus, gastritis esophageal polyp & question early Candida  Biopsies confirmed, gastritis no evidence of H pylori  EG biopsy showed no evidence of goblet cell metaplasia or eosinophilic esophagitis  Lower esophageal biopsy of white plaque was benign  Polyp biopsy as below  Pt empirically treated with Nystatin  Currently doing well on Pantoprazole 20 mg daily   -Continue current regimen  -Repeat EGD in June 2024 for Maciel's surveillance     3  Esophageal polyp  Lower esophageal polyp showed squamous epithelium with vascular congestion and minimal chronic inflammation suggestive of squamous papilloma on last endoscopy  -Repeat EGD in June 2024    4  Hx of adenomatous colonic polyps  5 tubular adenoma, 5 hyperplastic polyps removed last colonoscopy  -Colonoscopy due in December this year, recall placed  Follow up in 1 year or as needed  ______________________________________________________________________    SUBJECTIVE:  Juan Carlos Hawley is a 56 y o  male with history of GERD, Maciel's esophagus, esophageal polyp, and colon polyps here for follow-up to medication  Currently on pantoprazole 20 mg daily and doing well  Denies any breakthrough reflux, nausea/vomiting, abdominal pain, constipation, diarrhea, blood in stool, unintended weight loss  Recently broke his left ankle and wearing a boot now  Last EGD was in June 2021 and Maciel's esophagus, gastritis esophageal polyp & question early Candida  Biopsies confirmed, gastritis no evidence of H pylori  EGD done in biopsy showed no evidence of goblet cell metaplasia or eosinophilic esophagitis    Lower esophageal biopsy of white plaque was benign  Lower esophageal polyp showed squamous epithelium with vascular congestion and minimal chronic inflammation suggestive of squamous papilloma  Last colonoscopy was in December 2020 and 10 polyps removed, internal hemorrhoids, subtle melanosis coli, redundant colon  Bx showed 5 hyperplastic and 5 tubular adenoma  REVIEW OF SYSTEMS IS OTHERWISE NEGATIVE  Historical Information   Past Medical History:   Diagnosis Date    Maciel's esophagus     Colon polyp     Cough 06/11/2021    had PFT due to 30 year plus smoker - uses Breo Inhaler for maintenance    Diabetes mellitus ([facility] [address] )     type 2, accu check 113 at 0700 on 6/11/21    Esophageal polyp 06/11/2021    GERD (gastroesophageal reflux disease)     Hypertension      Past Surgical History:   Procedure Laterality Date    COLONOSCOPY      UPPER GASTROINTESTINAL ENDOSCOPY       Social History   Social History     Substance and Sexual Activity   Alcohol Use Yes    Comment: occ     Social History     Substance and Sexual Activity   Drug Use Never     Social History     Tobacco Use   Smoking Status Current Every Day Smoker    Packs/day: 2 00    Years: 36 00    Pack years: 72 00    Types: Cigarettes    Start date: 3/10/1984   Smokeless Tobacco Never Used     History reviewed  No pertinent family history      Meds/Allergies       Current Outpatient Medications:     atorvastatin (LIPITOR) 40 mg tablet    fluticasone-vilanterol (BREO ELLIPTA) 200-25 MCG/INH inhaler    hydrochlorothiazide (HYDRODIURIL) 25 mg tablet    losartan (COZAAR) 100 MG tablet    metFORMIN (GLUCOPHAGE) 1000 MG tablet    Multiple Vitamins-Minerals (multivitamin with minerals) tablet    oxyCODONE (ROXICODONE) 5 immediate release tablet    Ozempic, 0 25 or 0 5 MG/DOSE, 2 MG/1 5ML SOPN    pantoprazole (PROTONIX) 20 mg tablet    Xarelto 10 MG tablet    meloxicam (Mobic) 15 mg tablet    nystatin (MYCOSTATIN) 500,000 units/5 mL suspension    Semaglutide (OZEMPIC, 0 25 OR 0 5 MG/DOSE, SC)    No Known Allergies        Objective     Blood pressure 137/77, pulse 86, height 6' 4" (1 93 m), weight (!) 146 kg (322 lb)  Body mass index is 39 2 kg/m²  PHYSICAL EXAM:      General Appearance:   Alert, cooperative, no distress   HEENT:   Normocephalic, atraumatic, anicteric  Neck:  Supple, symmetrical, trachea midline   Lungs:   Clear to auscultation bilaterally; no rales, rhonchi or wheezing; respirations unlabored    Heart[de-identified]   Regular rate and rhythm; no murmur  Abdomen:   Soft, non-tender, non-distended; normal bowel sounds; no masses, no organomegaly    Genitalia:   Deferred    Rectal:   Deferred    Extremities:  No cyanosis, clubbing or edema    Skin:  No jaundice, rashes, or lesions    Lymph nodes:  No palpable cervical lymphadenopathy        Lab Results:   No visits with results within 1 Day(s) from this visit     Latest known visit with results is:   Appointment on 01/24/2022   Component Date Value    WBC 01/24/2022 9 08     RBC 01/24/2022 5 14     Hemoglobin 01/24/2022 16 9     Hematocrit 01/24/2022 49 1     MCV 01/24/2022 96     MCH 01/24/2022 32 9     MCHC 01/24/2022 34 4     RDW 01/24/2022 12 6     MPV 01/24/2022 10 3     Platelets 12/19/9077 251     nRBC 01/24/2022 0     Neutrophils Relative 01/24/2022 50     Immat GRANS % 01/24/2022 0     Lymphocytes Relative 01/24/2022 30     Monocytes Relative 01/24/2022 5     Eosinophils Relative 01/24/2022 14 (A)    Basophils Relative 01/24/2022 1     Neutrophils Absolute 01/24/2022 4 44     Immature Grans Absolute 01/24/2022 0 04     Lymphocytes Absolute 01/24/2022 2 74     Monocytes Absolute 01/24/2022 0 47     Eosinophils Absolute 01/24/2022 1 27 (A)    Basophils Absolute 01/24/2022 0 12 (A)    Sodium 01/24/2022 140     Potassium 01/24/2022 4 0     Chloride 01/24/2022 103     CO2 01/24/2022 28     ANION GAP 01/24/2022 9     BUN 01/24/2022 12     Creatinine 01/24/2022 1 26 (A)    Glucose, Fasting 01/24/2022 134 (A)    Calcium 01/24/2022 9 6     AST 01/24/2022 23     ALT 01/24/2022 34     Alkaline Phosphatase 01/24/2022 67 5     Total Protein 01/24/2022 7 3     Albumin 01/24/2022 4 6     Total Bilirubin 01/24/2022 0 57     eGFR 01/24/2022 64     Hemoglobin A1C 01/24/2022 5 9 (A)    EAG 01/24/2022 123     Cholesterol 01/24/2022 136     Triglycerides 01/24/2022 90 3     HDL, Direct 01/24/2022 36 (A)    LDL Calculated 01/24/2022 82     Non-HDL-Chol (CHOL-HDL) 01/24/2022 100     TSH 3RD GENERATON 01/24/2022 1 492     PSA 01/24/2022 0 6          Radiology Results:   XR ankle 3+ views LEFT    Result Date: 5/24/2022  Narrative: LEFT ANKLE INDICATION:   inversion injury pain laterally  COMPARISON:  None VIEWS:  XR ANKLE 3+ VW LEFT Images: 3 FINDINGS: There is an avulsion fracture of the distal lateral malleolus with adjacent soft tissue swelling  The ankle mortise is approximated, and the talar dome is intact  Soft tissue calcification is seen in the lower leg  No significant degenerative changes  Impression: Avulsion fracture distal lateral malleolus with adjacent soft tissue swelling   Workstation performed: [id number]

## 2022-06-10 ENCOUNTER — PROCEDURE VISIT (OUTPATIENT)
Dept: OBGYN CLINIC | Facility: CLINIC | Age: 57
End: 2022-06-10
Payer: COMMERCIAL

## 2022-06-10 VITALS — BODY MASS INDEX: 38.36 KG/M2 | WEIGHT: 315 LBS | HEIGHT: 76 IN

## 2022-06-10 DIAGNOSIS — M17.12 PRIMARY OSTEOARTHRITIS OF LEFT KNEE: Primary | ICD-10-CM

## 2022-06-10 PROCEDURE — 20610 DRAIN/INJ JOINT/BURSA W/O US: CPT | Performed by: PHYSICIAN ASSISTANT

## 2022-06-10 RX ORDER — HYALURONATE SODIUM 10 MG/ML
20 SYRINGE (ML) INTRAARTICULAR
Status: COMPLETED | OUTPATIENT
Start: 2022-06-10 | End: 2022-06-10

## 2022-06-10 RX ADMIN — Medication 20 MG: at 08:08

## 2022-06-10 NOTE — PROGRESS NOTES
1  Primary osteoarthritis of left knee  Large joint arthrocentesis: L knee     Patient is here for his 3rd injection of euflexxa into the left knee  Patient reports improvements  Physical exam of the knee shows no effusion no ecchymosis  All other organ systems normal    Large joint arthrocentesis: L knee  Universal Protocol:  Consent given by: patient  Time out: Immediately prior to procedure a "time out" was called to verify the correct patient, procedure, equipment, support staff and site/side marked as required  Site marked: the operative site was marked  Supporting Documentation  Indications: pain   Procedure Details  Location: knee - L knee  Preparation: Patient was prepped and draped in the usual sterile fashion  Needle size: 22 G  Ultrasound guidance: no  Approach: anterolateral  Medications administered: 20 mg Sodium Hyaluronate 20 MG/2ML    Patient tolerance: patient tolerated the procedure well with no immediate complications  Dressing:  Sterile dressing applied          Patient tolerated procedure follow up p r n

## 2022-06-30 ENCOUNTER — OFFICE VISIT (OUTPATIENT)
Dept: OBGYN CLINIC | Facility: CLINIC | Age: 57
End: 2022-06-30
Payer: OTHER MISCELLANEOUS

## 2022-06-30 ENCOUNTER — APPOINTMENT (OUTPATIENT)
Dept: RADIOLOGY | Facility: AMBULARY SURGERY CENTER | Age: 57
End: 2022-06-30
Attending: ORTHOPAEDIC SURGERY
Payer: COMMERCIAL

## 2022-06-30 VITALS
BODY MASS INDEX: 38.36 KG/M2 | WEIGHT: 315 LBS | HEIGHT: 76 IN | SYSTOLIC BLOOD PRESSURE: 150 MMHG | HEART RATE: 83 BPM | DIASTOLIC BLOOD PRESSURE: 79 MMHG

## 2022-06-30 DIAGNOSIS — S82.839A AVULSION FRACTURE OF DISTAL END OF FIBULA: Primary | ICD-10-CM

## 2022-06-30 DIAGNOSIS — S82.839A AVULSION FRACTURE OF DISTAL END OF FIBULA: ICD-10-CM

## 2022-06-30 PROCEDURE — 73610 X-RAY EXAM OF ANKLE: CPT

## 2022-06-30 PROCEDURE — 99212 OFFICE O/P EST SF 10 MIN: CPT | Performed by: ORTHOPAEDIC SURGERY

## 2022-06-30 NOTE — PROGRESS NOTES
Assessment:  1  Avulsion fracture of distal end of fibula  XR ankle 3+ vw left     Patient Active Problem List   Diagnosis    Acute bronchitis    Personal history of tobacco use    Simple chronic bronchitis (HCC)    Gastroesophageal reflux disease with esophagitis without hemorrhage    Redundant colon    Esophageal polyp    Hx of adenomatous colonic polyps    Maciel's esophagus without dysplasia    Overweight    Arthritis of knee, left           Plan      Activity is tolerated but he will need rehab to return to full duty at work because he is a   Will have him return in 3 weeks at which time we will re-evaluate as proprioception and probably allow him to return to full duty            Subjective:     Patient ID:    Chief Arline [facility] 56 y o  male      HPI    Patient comes in today with regards to his left ankle he reports he is doing better he takes the boot off but sometimes feels unsafe without the boot        The following portions of the patient's history were reviewed and updated as appropriate: allergies, current medications, past family history, past social history, past surgical history and problem list     All organ systems normal    Social History     Socioeconomic History    Marital status: /Civil Union     Spouse name: Not on file    Number of children: Not on file    Years of education: Not on file    Highest education level: Not on file   Occupational History    Not on file   Tobacco Use    Smoking status: Current Every Day Smoker     Packs/day: 2 00     Years: 36 00     Pack years: 72 00     Types: Cigarettes     Start date: 3/10/1984    Smokeless tobacco: Never Used   Vaping Use    Vaping Use: Never used   Substance and Sexual Activity    Alcohol use: Yes     Comment: occ    Drug use: Never    Sexual activity: Not on file   Other Topics Concern    Not on file   Social History Narrative    Not on file     Social Determinants of Health Financial Resource Strain: Not on file   Food Insecurity: Not on file   Transportation Needs: Not on file   Physical Activity: Not on file   Stress: Not on file   Social Connections: Not on file   Intimate Partner Violence: Not on file   Housing Stability: Not on file     Past Medical History:   Diagnosis Date    Maciel's esophagus     Colon polyp     Cough 06/11/2021    had PFT due to 30 year plus smoker - uses Breo Inhaler for maintenance    Diabetes mellitus ([facility] [address] )     type 2, accu check 113 at 0700 on 6/11/21    Esophageal polyp 06/11/2021    GERD (gastroesophageal reflux disease)     Hypertension      Past Surgical History:   Procedure Laterality Date    COLONOSCOPY      UPPER GASTROINTESTINAL ENDOSCOPY       No Known Allergies  Current Outpatient Medications on File Prior to Visit   Medication Sig Dispense Refill    atorvastatin (LIPITOR) 40 mg tablet       fluticasone-vilanterol (BREO ELLIPTA) 200-25 MCG/INH inhaler       hydrochlorothiazide (HYDRODIURIL) 25 mg tablet Take 25 mg by mouth daily      losartan (COZAAR) 100 MG tablet Take 100 mg by mouth daily      meloxicam (Mobic) 15 mg tablet Take 1 tablet (15 mg total) by mouth daily As needed (Patient not taking: No sig reported) 30 tablet 1    metFORMIN (GLUCOPHAGE) 1000 MG tablet Take 1,000 mg by mouth 2 (two) times a day      Multiple Vitamins-Minerals (multivitamin with minerals) tablet Take 1 tablet by mouth daily      nystatin (MYCOSTATIN) 500,000 units/5 mL suspension Apply 5 mL (500,000 Units total) to the mouth or throat 4 (four) times a day (Patient not taking: Reported on 3/21/2022 ) 150 mL 0    oxyCODONE (ROXICODONE) 5 immediate release tablet       Ozempic, 0 25 or 0 5 MG/DOSE, 2 MG/1 5ML SOPN USE AS DIRECTED- 0 5MG ONCE WEEKLY      pantoprazole (PROTONIX) 20 mg tablet Take 1 tablet (20 mg total) by mouth daily before breakfast TAKE ONE TABLET "ONE-HALF"-HOUR BEFORE BREAKFAST AND IN THE EVENING 90 tablet 0    Semaglutide (OZEMPIC, 0 25 OR 0 5 MG/DOSE, SC)       Xarelto 10 MG tablet        No current facility-administered medications on file prior to visit  Objective:        Ortho Exam  Proprioception diminished on the left ankle swelling is almost completely resolved  I have personally reviewed pertinent films in PACS  Portions of the record may have been created with voice recognition software   Occasional wrong word or "sound a like" substitutions may have occurred due to the inherent limitations of voice recognition software   Read the chart carefully and recognize, using context, where substitutions have occurred

## 2022-07-01 ENCOUNTER — TELEPHONE (OUTPATIENT)
Dept: OBGYN CLINIC | Facility: HOSPITAL | Age: 57
End: 2022-07-01

## 2022-07-14 ENCOUNTER — APPOINTMENT (OUTPATIENT)
Dept: RADIOLOGY | Facility: AMBULARY SURGERY CENTER | Age: 57
End: 2022-07-14
Attending: ORTHOPAEDIC SURGERY
Payer: COMMERCIAL

## 2022-07-14 ENCOUNTER — OFFICE VISIT (OUTPATIENT)
Dept: OBGYN CLINIC | Facility: CLINIC | Age: 57
End: 2022-07-14
Payer: OTHER MISCELLANEOUS

## 2022-07-14 VITALS — BODY MASS INDEX: 38.36 KG/M2 | WEIGHT: 315 LBS | HEIGHT: 76 IN

## 2022-07-14 DIAGNOSIS — S82.839A AVULSION FRACTURE OF DISTAL END OF FIBULA: Primary | ICD-10-CM

## 2022-07-14 DIAGNOSIS — S82.839A AVULSION FRACTURE OF DISTAL END OF FIBULA: ICD-10-CM

## 2022-07-14 PROCEDURE — 73610 X-RAY EXAM OF ANKLE: CPT

## 2022-07-14 PROCEDURE — 99213 OFFICE O/P EST LOW 20 MIN: CPT | Performed by: ORTHOPAEDIC SURGERY

## 2022-07-14 NOTE — PROGRESS NOTES
Assessment:  1  Avulsion fracture of distal end of fibula       Patient Active Problem List   Diagnosis    Acute bronchitis    Personal history of tobacco use    Simple chronic bronchitis (HCC)    Gastroesophageal reflux disease with esophagitis without hemorrhage    Redundant colon    Esophageal polyp    Hx of adenomatous colonic polyps    Maciel's esophagus without dysplasia    Overweight    Arthritis of knee, left           Plan      At this time due to improvement he may return to full duty  He has no limitations at this time  He was advised that the swelling that has continued is normal and can last up to 1 year  Subjective:     Patient ID:    Chief Complaint:Calvin Castaneda 56 y o  male who presents to the office for a follow up for left ankle distal fibula fracture  Pt was originally seen at the ED 5/24/2022 and then followed up in the office 6/2/2022 and again 6/30/2022  At his most recent visit he was advised that he could perform activities as tolerated and work with therapy to be able to return to work as a   Today he states that he has no pain although is concerned about the continued lateral swelling  He has been bale to cut his gras and other activities without any limitations or pain         The following portions of the patient's history were reviewed and updated as appropriate: allergies, current medications, past family history, past social history, past surgical history and problem list     All organ systems normal    Social History     Socioeconomic History    Marital status: /Civil Union     Spouse name: Not on file    Number of children: Not on file    Years of education: Not on file    Highest education level: Not on file   Occupational History    Not on file   Tobacco Use    Smoking status: Current Every Day Smoker     Packs/day: 2 00     Years: 36 00     Pack years: 72 00     Types: Cigarettes     Start date: 3/10/1984    Smokeless tobacco: Never Used   Vaping Use    Vaping Use: Never used   Substance and Sexual Activity    Alcohol use: Yes     Comment: occ    Drug use: Never    Sexual activity: Not on file   Other Topics Concern    Not on file   Social History Narrative    Not on file     Social Determinants of Health     Financial Resource Strain: Not on file   Food Insecurity: Not on file   Transportation Needs: Not on file   Physical Activity: Not on file   Stress: Not on file   Social Connections: Not on file   Intimate Partner Violence: Not on file   Housing Stability: Not on file     Past Medical History:   Diagnosis Date    Maciel's esophagus     Colon polyp     Cough 06/11/2021    had PFT due to 30 year plus smoker - uses Breo Inhaler for maintenance    Diabetes mellitus ([facility] [address] )     type 2, accu check 113 at 0700 on 6/11/21    Esophageal polyp 06/11/2021    GERD (gastroesophageal reflux disease)     Hypertension      Past Surgical History:   Procedure Laterality Date    COLONOSCOPY      UPPER GASTROINTESTINAL ENDOSCOPY       No Known Allergies  Current Outpatient Medications on File Prior to Visit   Medication Sig Dispense Refill    atorvastatin (LIPITOR) 40 mg tablet       fluticasone-vilanterol (BREO ELLIPTA) 200-25 MCG/INH inhaler       hydrochlorothiazide (HYDRODIURIL) 25 mg tablet Take 25 mg by mouth daily      losartan (COZAAR) 100 MG tablet Take 100 mg by mouth daily      meloxicam (Mobic) 15 mg tablet Take 1 tablet (15 mg total) by mouth daily As needed (Patient not taking: No sig reported) 30 tablet 1    metFORMIN (GLUCOPHAGE) 1000 MG tablet Take 1,000 mg by mouth 2 (two) times a day      Multiple Vitamins-Minerals (multivitamin with minerals) tablet Take 1 tablet by mouth daily      nystatin (MYCOSTATIN) 500,000 units/5 mL suspension Apply 5 mL (500,000 Units total) to the mouth or throat 4 (four) times a day (Patient not taking: Reported on 3/21/2022 ) 150 mL 0    oxyCODONE (ROXICODONE) 5 immediate release tablet       Ozempic, 0 25 or 0 5 MG/DOSE, 2 MG/1 5ML SOPN USE AS DIRECTED- 0 5MG ONCE WEEKLY      pantoprazole (PROTONIX) 20 mg tablet Take 1 tablet (20 mg total) by mouth daily before breakfast TAKE ONE TABLET "ONE-HALF"-HOUR BEFORE BREAKFAST AND IN THE EVENING 90 tablet 0    Semaglutide (OZEMPIC, 0 25 OR 0 5 MG/DOSE, SC)       Xarelto 10 MG tablet        No current facility-administered medications on file prior to visit  Objective:        Left Ankle Exam     Comments:  Improved proprioception when compared to previous exam  Lateral swelling   No pain with motion of the ankle or ambulation                 I have personally reviewed pertinent films in PACS and my interpretation is there is maintained allignment of distal fibula fracture  Portions of the record may have been created with voice recognition software   Occasional wrong word or "sound a like" substitutions may have occurred due to the inherent limitations of voice recognition software   Read the chart carefully and recognize, using context, where substitutions have occurred

## 2022-07-14 NOTE — LETTER
July 14, 2022     Patient: Florencio Hilario  YOB: 1965  Date of Visit: 7/14/2022      To Whom it May Concern:    Jay Flanagan is under my professional care  Faith Massey was seen in my office on 7/14/2022  Faith Massey may return to full duty  If you have any questions or concerns, please don't hesitate to call           Sincerely,          Louisa Dockery DO        CC: No Recipients

## 2022-07-20 ENCOUNTER — TELEPHONE (OUTPATIENT)
Dept: OBGYN CLINIC | Facility: HOSPITAL | Age: 57
End: 2022-07-20

## 2022-07-20 NOTE — TELEPHONE ENCOUNTER
DR Rona Farnsworth  RE: FAX OVN and work letter from 7/14/22  # [phone]     Gene from [facility] work comp called asking for [employer] and work letter from 7/14/22 to be faxed to her      I e-faxed documents to:      Deepa Douglas: [id number]   FAX# [phone]

## 2022-08-11 DIAGNOSIS — K21.00 GASTROESOPHAGEAL REFLUX DISEASE WITH ESOPHAGITIS WITHOUT HEMORRHAGE: ICD-10-CM

## 2022-08-11 RX ORDER — PANTOPRAZOLE SODIUM 20 MG/1
20 TABLET, DELAYED RELEASE ORAL
Qty: 90 TABLET | Refills: 0 | Status: SHIPPED | OUTPATIENT
Start: 2022-08-11 | End: 2022-08-12

## 2022-08-11 NOTE — TELEPHONE ENCOUNTER
Sudhir Harrington called from [employer] won't cover Pantoprazole 20 mg twice a day  Requesting to change to Pantoprazole 40 mg once a day

## 2022-08-12 RX ORDER — PANTOPRAZOLE SODIUM 40 MG/1
40 TABLET, DELAYED RELEASE ORAL DAILY
Qty: 90 TABLET | Refills: 1 | Status: SHIPPED | OUTPATIENT
Start: 2022-08-12

## 2022-11-03 DIAGNOSIS — K21.00 GASTROESOPHAGEAL REFLUX DISEASE WITH ESOPHAGITIS WITHOUT HEMORRHAGE: ICD-10-CM

## 2022-11-03 RX ORDER — PANTOPRAZOLE SODIUM 40 MG/1
40 TABLET, DELAYED RELEASE ORAL DAILY
Qty: 90 TABLET | Refills: 1 | Status: SHIPPED | OUTPATIENT
Start: 2022-11-03

## 2022-11-09 ENCOUNTER — APPOINTMENT (OUTPATIENT)
Dept: LAB | Facility: HOSPITAL | Age: 57
End: 2022-11-09
Attending: INTERNAL MEDICINE

## 2022-11-09 DIAGNOSIS — S82.402D CLOSED FRACTURE OF SHAFT OF LEFT FIBULA WITH ROUTINE HEALING, UNSPECIFIED FRACTURE MORPHOLOGY, SUBSEQUENT ENCOUNTER: ICD-10-CM

## 2022-11-09 DIAGNOSIS — J44.9 OBSTRUCTIVE CHRONIC BRONCHITIS WITHOUT EXACERBATION (HCC): ICD-10-CM

## 2022-11-09 DIAGNOSIS — E11.65 TYPE II DIABETES MELLITUS WITH HYPEROSMOLARITY, UNCONTROLLED (HCC): ICD-10-CM

## 2022-11-09 DIAGNOSIS — I10 HYPERTENSION, ESSENTIAL: ICD-10-CM

## 2022-11-09 DIAGNOSIS — E11.00 TYPE II DIABETES MELLITUS WITH HYPEROSMOLARITY, UNCONTROLLED (HCC): ICD-10-CM

## 2022-11-09 DIAGNOSIS — E78.5 HYPERLIPIDEMIA, UNSPECIFIED HYPERLIPIDEMIA TYPE: ICD-10-CM

## 2022-11-09 LAB
ALBUMIN SERPL BCP-MCNC: 4.5 G/DL (ref 3.5–5)
ALP SERPL-CCNC: 67 U/L (ref 34–104)
ALT SERPL W P-5'-P-CCNC: 52 U/L (ref 7–52)
ANION GAP SERPL CALCULATED.3IONS-SCNC: 7 MMOL/L (ref 4–13)
AST SERPL W P-5'-P-CCNC: 30 U/L (ref 13–39)
BASOPHILS # BLD AUTO: 0.1 THOUSANDS/ÂΜL (ref 0–0.1)
BASOPHILS NFR BLD AUTO: 1 % (ref 0–1)
BILIRUB SERPL-MCNC: 0.67 MG/DL (ref 0.2–1)
BUN SERPL-MCNC: 14 MG/DL (ref 5–25)
CALCIUM SERPL-MCNC: 9.8 MG/DL (ref 8.4–10.2)
CHLORIDE SERPL-SCNC: 103 MMOL/L (ref 96–108)
CHOLEST SERPL-MCNC: 137 MG/DL
CO2 SERPL-SCNC: 29 MMOL/L (ref 21–32)
CREAT SERPL-MCNC: 1.04 MG/DL (ref 0.6–1.3)
EOSINOPHIL # BLD AUTO: 0.42 THOUSAND/ÂΜL (ref 0–0.61)
EOSINOPHIL NFR BLD AUTO: 5 % (ref 0–6)
ERYTHROCYTE [DISTWIDTH] IN BLOOD BY AUTOMATED COUNT: 12.5 % (ref 11.6–15.1)
EST. AVERAGE GLUCOSE BLD GHB EST-MCNC: 123 MG/DL
GFR SERPL CREATININE-BSD FRML MDRD: 80 ML/MIN/1.73SQ M
GLUCOSE P FAST SERPL-MCNC: 134 MG/DL (ref 65–99)
HBA1C MFR BLD: 5.9 %
HCT VFR BLD AUTO: 49.3 % (ref 36.5–49.3)
HDLC SERPL-MCNC: 35 MG/DL
HGB BLD-MCNC: 17.2 G/DL (ref 12–17)
IMM GRANULOCYTES # BLD AUTO: 0.04 THOUSAND/UL (ref 0–0.2)
IMM GRANULOCYTES NFR BLD AUTO: 0 % (ref 0–2)
LDLC SERPL CALC-MCNC: 70 MG/DL (ref 0–100)
LYMPHOCYTES # BLD AUTO: 2.62 THOUSANDS/ÂΜL (ref 0.6–4.47)
LYMPHOCYTES NFR BLD AUTO: 29 % (ref 14–44)
MCH RBC QN AUTO: 33.3 PG (ref 26.8–34.3)
MCHC RBC AUTO-ENTMCNC: 34.9 G/DL (ref 31.4–37.4)
MCV RBC AUTO: 95 FL (ref 82–98)
MONOCYTES # BLD AUTO: 0.49 THOUSAND/ÂΜL (ref 0.17–1.22)
MONOCYTES NFR BLD AUTO: 6 % (ref 4–12)
NEUTROPHILS # BLD AUTO: 5.26 THOUSANDS/ÂΜL (ref 1.85–7.62)
NEUTS SEG NFR BLD AUTO: 59 % (ref 43–75)
NONHDLC SERPL-MCNC: 102 MG/DL
NRBC BLD AUTO-RTO: 0 /100 WBCS
PLATELET # BLD AUTO: 248 THOUSANDS/UL (ref 149–390)
PMV BLD AUTO: 10 FL (ref 8.9–12.7)
POTASSIUM SERPL-SCNC: 4.5 MMOL/L (ref 3.5–5.3)
PROT SERPL-MCNC: 7.4 G/DL (ref 6.4–8.4)
RBC # BLD AUTO: 5.17 MILLION/UL (ref 3.88–5.62)
SODIUM SERPL-SCNC: 139 MMOL/L (ref 135–147)
TRIGL SERPL-MCNC: 161 MG/DL
TSH SERPL DL<=0.05 MIU/L-ACNC: 1.23 UIU/ML (ref 0.45–4.5)
WBC # BLD AUTO: 8.93 THOUSAND/UL (ref 4.31–10.16)

## 2022-11-22 ENCOUNTER — TELEPHONE (OUTPATIENT)
Dept: GASTROENTEROLOGY | Facility: CLINIC | Age: 57
End: 2022-11-22

## 2022-11-22 NOTE — TELEPHONE ENCOUNTER
Recall ltr for colon with Dr Forrest Vasques due to hx of multiple polyps, ta,hyperplastic polyps due 12/23/22

## 2022-12-02 ENCOUNTER — LAB (OUTPATIENT)
Dept: LAB | Facility: HOSPITAL | Age: 57
End: 2022-12-02

## 2022-12-02 ENCOUNTER — OFFICE VISIT (OUTPATIENT)
Dept: OBGYN CLINIC | Facility: CLINIC | Age: 57
End: 2022-12-02

## 2022-12-02 ENCOUNTER — APPOINTMENT (OUTPATIENT)
Dept: RADIOLOGY | Facility: AMBULARY SURGERY CENTER | Age: 57
End: 2022-12-02
Attending: ORTHOPAEDIC SURGERY

## 2022-12-02 VITALS — BODY MASS INDEX: 38.36 KG/M2 | WEIGHT: 315 LBS | HEIGHT: 76 IN

## 2022-12-02 DIAGNOSIS — M70.22 OLECRANON BURSITIS, LEFT ELBOW: Primary | ICD-10-CM

## 2022-12-02 DIAGNOSIS — M70.22 OLECRANON BURSITIS, LEFT ELBOW: ICD-10-CM

## 2022-12-02 DIAGNOSIS — M25.522 PAIN IN LEFT ELBOW: ICD-10-CM

## 2022-12-02 DIAGNOSIS — L03.114 CELLULITIS OF LEFT ELBOW: ICD-10-CM

## 2022-12-02 LAB
APPEARANCE FLD: ABNORMAL
COLOR FLD: ABNORMAL
CRYSTALS SNV QL MICRO: NORMAL
LYMPHOCYTES # SNV MANUAL: 4 %
MONOCYTES NFR SNV MANUAL: 2 %
NEUTROPHILS NFR SNV MANUAL: 94 %
RBC # SNV MANUAL: 8000 /UL (ref 0–10)
SITE: ABNORMAL
TOTAL CELLS COUNTED SPEC: 100
WBC # FLD MANUAL: 8779 /UL

## 2022-12-02 RX ORDER — LIDOCAINE HYDROCHLORIDE 10 MG/ML
4 INJECTION, SOLUTION INFILTRATION; PERINEURAL
Status: COMPLETED | OUTPATIENT
Start: 2022-12-02 | End: 2022-12-02

## 2022-12-02 RX ORDER — SULFAMETHOXAZOLE AND TRIMETHOPRIM 800; 160 MG/1; MG/1
1 TABLET ORAL EVERY 12 HOURS SCHEDULED
Qty: 14 TABLET | Refills: 0 | Status: SHIPPED | OUTPATIENT
Start: 2022-12-02 | End: 2022-12-09

## 2022-12-02 RX ORDER — SULFAMETHOXAZOLE AND TRIMETHOPRIM 800; 160 MG/1; MG/1
1 TABLET ORAL EVERY 12 HOURS SCHEDULED
Qty: 14 TABLET | Refills: 0 | Status: SHIPPED | OUTPATIENT
Start: 2022-12-02 | End: 2022-12-02 | Stop reason: SDUPTHER

## 2022-12-02 RX ADMIN — LIDOCAINE HYDROCHLORIDE 4 ML: 10 INJECTION, SOLUTION INFILTRATION; PERINEURAL at 15:06

## 2022-12-04 LAB
BACTERIA SPEC BFLD CULT: ABNORMAL
GRAM STN SPEC: ABNORMAL

## 2022-12-05 LAB
BACTERIA SPEC BFLD CULT: ABNORMAL
GRAM STN SPEC: ABNORMAL

## 2022-12-08 ENCOUNTER — TELEPHONE (OUTPATIENT)
Dept: OBGYN CLINIC | Facility: CLINIC | Age: 57
End: 2022-12-08

## 2022-12-08 NOTE — TELEPHONE ENCOUNTER
Caller: patient  Doctor:      Reason for call: patient wife would like a cb from [facility] back#: [phone] Ortega Betancourt

## 2022-12-08 NOTE — TELEPHONE ENCOUNTER
Called and spoke to the patient's spouse  She reported that the patient's elbow redness has nearly resolved except for the back of the elbow is still swollen and warm to touch  Advised that the patient come in earlier tomorrow    We changed the appointment to 8 AM   Also recommended the patient do not eat breakfast or any dairy just small sips of clear fluids in case operative excision of olecranon bursa is found to be necessary

## 2022-12-09 ENCOUNTER — APPOINTMENT (OUTPATIENT)
Dept: LAB | Facility: HOSPITAL | Age: 57
End: 2022-12-09

## 2022-12-09 ENCOUNTER — OFFICE VISIT (OUTPATIENT)
Dept: OBGYN CLINIC | Facility: CLINIC | Age: 57
End: 2022-12-09

## 2022-12-09 VITALS — WEIGHT: 315 LBS | HEIGHT: 76 IN | BODY MASS INDEX: 38.36 KG/M2

## 2022-12-09 DIAGNOSIS — M70.22 OLECRANON BURSITIS, LEFT ELBOW: ICD-10-CM

## 2022-12-09 DIAGNOSIS — M70.22 OLECRANON BURSITIS, LEFT ELBOW: Primary | ICD-10-CM

## 2022-12-09 LAB
COLOR FLD: NORMAL
CRYSTALS SNV QL MICRO: NORMAL
HISTIOCYTES NFR SNV MANUAL: 3 %
LYMPHOCYTES # SNV MANUAL: 7 %
MONOCYTES NFR SNV MANUAL: 1 %
NEUTROPHILS NFR SNV MANUAL: 89 %
RBC # SNV MANUAL: ABNORMAL /UL (ref 0–10)
SITE: NORMAL
TOTAL CELLS COUNTED SPEC: 100
WBC # FLD MANUAL: 7366 /UL

## 2022-12-09 RX ORDER — LIDOCAINE HYDROCHLORIDE 10 MG/ML
5 INJECTION, SOLUTION EPIDURAL; INFILTRATION; INTRACAUDAL; PERINEURAL
Status: COMPLETED | OUTPATIENT
Start: 2022-12-09 | End: 2022-12-09

## 2022-12-09 RX ADMIN — LIDOCAINE HYDROCHLORIDE 5 ML: 10 INJECTION, SOLUTION EPIDURAL; INFILTRATION; INTRACAUDAL; PERINEURAL at 08:35

## 2022-12-09 NOTE — PROGRESS NOTES
Assessment:  1  Olecranon bursitis, left elbow  Medium joint arthrocentesis: L olecranon bursa    Synovial fluid white cell count w/ diff    Body fluid culture and Gram stain    RBC count,Synovial Fluid    Synovial fluid, crystal          55 yo male with Left elbow olecranon bursa hematoma / seroma       Patient Active Problem List   Diagnosis   • Acute bronchitis   • Personal history of tobacco use   • Simple chronic bronchitis (HCC)   • Gastroesophageal reflux disease with esophagitis without hemorrhage   • Redundant colon   • Esophageal polyp   • Hx of adenomatous colonic polyps   • Maciel's esophagus without dysplasia   • Overweight   • Arthritis of knee, left   • Cellulitis of left elbow   • Olecranon bursitis, left elbow         Plan  - Discussed conservative treatment and surgical intervention with patient at length  - Based on clinical presentation and low clinical suspicion for infection at this time, repeat aspiration  performed at bedside    -Patient tolerated procedure well  - Fluid sent for repeat laboratory analysis  - Compressive dressing applied to patient  -Discussed possibility of surgical intervention in future pending symptoms  - Over the counter analgesics as needed / directed   - Ice / heat as directed   -Discussed that hematoma/seroma should resolve in time  - Follow up PRN         Subjective:     Patient ID:    Chief Complaint:Calvin Ludwigace Tonya 56 y o  male      HPI  Patient is a 21-year-old male presents to the office for a follow up for left elbow swelling  Patient was previously diagnosed with olecranon bursitis of the left elbow which was aspirated on 12/2/22  Patient was placed on oral ABX  He states that his elbow is doing well overall  Patient states that he does note increased elbow swelling recently but denies any redness  Patient states that he completed his course of antibiotic as prescribed  Patient denies any fevers any chills  Patient denies any numbness or tingling his arm  Patient offers no other complaints at this time        The following portions of the patient's history were reviewed and updated as appropriate: allergies, current medications, past family history, past social history, past surgical history and problem list         Social History     Socioeconomic History   • Marital status: /Civil Union     Spouse name: Not on file   • Number of children: Not on file   • Years of education: Not on file   • Highest education level: Not on file   Occupational History   • Not on file   Tobacco Use   • Smoking status: Every Day     Packs/day: 2 00     Years: 36 00     Pack years: 72 00     Types: Cigarettes     Start date: 3/10/1984   • Smokeless tobacco: Never   Vaping Use   • Vaping Use: Never used   Substance and Sexual Activity   • Alcohol use: Yes     Comment: occ   • Drug use: Never   • Sexual activity: Not on file   Other Topics Concern   • Not on file   Social History Narrative   • Not on file     Social Determinants of Health     Financial Resource Strain: Not on file   Food Insecurity: Not on file   Transportation Needs: Not on file   Physical Activity: Not on file   Stress: Not on file   Social Connections: Not on file   Intimate Partner Violence: Not on file   Housing Stability: Not on file     Past Medical History:   Diagnosis Date   • Maciel's esophagus    • Colon polyp    • Cough 06/11/2021    had PFT due to 30 year plus smoker - uses Breo Inhaler for maintenance   • [facility]     type 2, accu check 113 at 0700 on 6/11/21   • Esophageal polyp 06/11/2021   • GERD (gastroesophageal reflux disease)    • Hypertension      Past Surgical History:   Procedure Laterality Date   • COLONOSCOPY     • UPPER GASTROINTESTINAL ENDOSCOPY       No Known Allergies  Current Outpatient Medications on File Prior to Visit   Medication Sig Dispense Refill   • atorvastatin (LIPITOR) 40 mg tablet      • fluticasone-vilanterol (BREO ELLIPTA) 200-25 MCG/INH inhaler      • hydrochlorothiazide (HYDRODIURIL) 25 mg tablet Take 25 mg by mouth daily     • losartan (COZAAR) 100 MG tablet Take 100 mg by mouth daily     • meloxicam (Mobic) 15 mg tablet Take 1 tablet (15 mg total) by mouth daily As needed (Patient not taking: No sig reported) 30 tablet 1   • metFORMIN (GLUCOPHAGE) 1000 MG tablet Take 1,000 mg by mouth 2 (two) times a day     • Multiple Vitamins-Minerals (multivitamin with minerals) tablet Take 1 tablet by mouth daily     • nystatin (MYCOSTATIN) 500,000 units/5 mL suspension Apply 5 mL (500,000 Units total) to the mouth or throat 4 (four) times a day (Patient not taking: Reported on 3/21/2022 ) 150 mL 0   • oxyCODONE (ROXICODONE) 5 immediate release tablet      • Ozempic, 0 25 or 0 5 MG/DOSE, 2 MG/1 5ML SOPN USE AS DIRECTED- 0 5MG ONCE WEEKLY     • pantoprazole (PROTONIX) 40 mg tablet TAKE 1 TABLET (40 MG TOTAL) BY MOUTH DAILY 90 tablet 1   • Semaglutide (OZEMPIC, 0 25 OR 0 5 MG/DOSE, SC)      • sulfamethoxazole-trimethoprim (BACTRIM DS) 800-160 mg per tablet Take 1 tablet by mouth every 12 (twelve) hours for 7 days 14 tablet 0   • Xarelto 10 MG tablet        No current facility-administered medications on file prior to visit  Objective:    Review of Systems   Constitutional: Negative for chills and fever  HENT: Negative for ear pain and sore throat  Eyes: Negative for pain  Respiratory: Negative for cough, chest tightness, shortness of breath and wheezing  Cardiovascular: Negative for chest pain  Gastrointestinal: Negative for abdominal pain, diarrhea, nausea and vomiting  Endocrine: Negative  Genitourinary: Negative  Allergic/Immunologic: Negative  Neurological: Negative for weakness and numbness  Hematological: Negative  Psychiatric/Behavioral: Negative          Left elbow examination:  - Patient sitting comfortably in the office in no acute distress   -Large area of swelling noted over the posterior aspect of the elbow (olecranon bursa) with no erythema or ecchymosis present  No open wounds present  Extremity appears are perfused overall     -Minimal tenderness palpation noted over the posterior aspect of the elbow  -Full range of motion of the elbow noted with pain associate at terminal flexion   - NV intact        Physical Exam  Vitals and nursing note reviewed  Constitutional:       Appearance: He is well-developed  HENT:      Head: Normocephalic  Eyes:      Conjunctiva/sclera: Conjunctivae normal       Pupils: Pupils are equal, round, and reactive to light  Cardiovascular:      Rate and Rhythm: Normal rate and regular rhythm  Pulses: Normal pulses  Pulmonary:      Effort: Pulmonary effort is normal       Breath sounds: Normal breath sounds  Musculoskeletal:      Cervical back: Normal range of motion  Skin:     General: Skin is warm  Capillary Refill: Capillary refill takes less than 2 seconds  Neurological:      Mental Status: He is alert and oriented to person, place, and time  Psychiatric:         Behavior: Behavior normal          Thought Content: Thought content normal          Judgment: Judgment normal          Medium joint arthrocentesis: L olecranon bursa  Universal Protocol:  Consent: Verbal consent obtained    Risks and benefits: risks, benefits and alternatives were discussed  Consent given by: patient  Patient understanding: patient states understanding of the procedure being performed  Site marked: the operative site was marked  Patient identity confirmed: verbally with patient    Supporting Documentation  Indications: diagnostic evaluation   Procedure Details  Location: elbow - L olecranon bursa  Needle size: 18 G  Ultrasound guidance: no  Approach: posterior  Medications administered: 5 mL lidocaine (PF) 1 %    Aspirate amount: 7 mL  Aspirate: blood-tinged and serous  Analysis: fluid sample sent for laboratory analysis    Patient tolerance: patient tolerated the procedure well with no immediate complications  Dressing:  Sterile dressing applied              Portions of the record may have been created with voice recognition software   Occasional wrong word or "sound a like" substitutions may have occurred due to the inherent limitations of voice recognition software   Read the chart carefully and recognize, using context, where substitutions have occurred

## 2022-12-11 LAB
BACTERIA SPEC BFLD CULT: NO GROWTH
GRAM STN SPEC: NORMAL
GRAM STN SPEC: NORMAL

## 2022-12-12 ENCOUNTER — HOSPITAL ENCOUNTER (OUTPATIENT)
Dept: CT IMAGING | Facility: HOSPITAL | Age: 57
Discharge: HOME | End: 2022-12-12
Attending: INTERNAL MEDICINE

## 2022-12-12 DIAGNOSIS — Z12.9 SCREENING FOR MALIGNANT NEOPLASM: ICD-10-CM

## 2022-12-12 LAB
BACTERIA SPEC BFLD CULT: NO GROWTH
GRAM STN SPEC: NORMAL
GRAM STN SPEC: NORMAL

## 2022-12-15 ENCOUNTER — HOSPITAL ENCOUNTER (OUTPATIENT)
Dept: CT IMAGING | Facility: HOSPITAL | Age: 57
End: 2022-12-15
Attending: INTERNAL MEDICINE

## 2022-12-15 DIAGNOSIS — E27.8 ADRENAL NODULE (HCC): ICD-10-CM

## 2023-05-03 DIAGNOSIS — K21.00 GASTROESOPHAGEAL REFLUX DISEASE WITH ESOPHAGITIS WITHOUT HEMORRHAGE: ICD-10-CM

## 2023-05-03 RX ORDER — PANTOPRAZOLE SODIUM 40 MG/1
40 TABLET, DELAYED RELEASE ORAL DAILY
Qty: 90 TABLET | Refills: 1 | Status: SHIPPED | OUTPATIENT
Start: 2023-05-03

## 2023-07-31 ENCOUNTER — OFFICE VISIT (OUTPATIENT)
Dept: OBGYN CLINIC | Facility: CLINIC | Age: 58
End: 2023-07-31
Payer: COMMERCIAL

## 2023-07-31 ENCOUNTER — TELEPHONE (OUTPATIENT)
Dept: OBGYN CLINIC | Facility: CLINIC | Age: 58
End: 2023-07-31

## 2023-07-31 ENCOUNTER — APPOINTMENT (OUTPATIENT)
Dept: RADIOLOGY | Facility: AMBULARY SURGERY CENTER | Age: 58
End: 2023-07-31
Attending: ORTHOPAEDIC SURGERY
Payer: COMMERCIAL

## 2023-07-31 DIAGNOSIS — M71.21 BAKER'S CYST OF KNEE, RIGHT: ICD-10-CM

## 2023-07-31 DIAGNOSIS — M25.561 RIGHT KNEE PAIN, UNSPECIFIED CHRONICITY: ICD-10-CM

## 2023-07-31 DIAGNOSIS — M25.561 RIGHT KNEE PAIN, UNSPECIFIED CHRONICITY: Primary | ICD-10-CM

## 2023-07-31 DIAGNOSIS — M17.11 PRIMARY OSTEOARTHRITIS OF RIGHT KNEE: ICD-10-CM

## 2023-07-31 PROCEDURE — 99214 OFFICE O/P EST MOD 30 MIN: CPT | Performed by: ORTHOPAEDIC SURGERY

## 2023-07-31 PROCEDURE — 20610 DRAIN/INJ JOINT/BURSA W/O US: CPT | Performed by: ORTHOPAEDIC SURGERY

## 2023-07-31 PROCEDURE — 73562 X-RAY EXAM OF KNEE 3: CPT

## 2023-12-30 ENCOUNTER — APPOINTMENT (OUTPATIENT)
Dept: LAB | Facility: HOSPITAL | Age: 58
End: 2023-12-30
Attending: INTERNAL MEDICINE
Payer: COMMERCIAL

## 2023-12-30 ENCOUNTER — TRANSCRIBE ORDERS (OUTPATIENT)
Dept: LAB | Facility: HOSPITAL | Age: 58
End: 2023-12-30

## 2023-12-30 DIAGNOSIS — I10 ESSENTIAL HYPERTENSION, MALIGNANT: ICD-10-CM

## 2023-12-30 DIAGNOSIS — E13.39 DIABETES MELLITUS OF OTHER TYPE WITH OTHER OPHTHALMIC COMPLICATION, UNSPECIFIED WHETHER LONG TERM INSULIN USE (HCC): ICD-10-CM

## 2023-12-30 DIAGNOSIS — D51.8 OTHER VITAMIN B12 DEFICIENCY ANEMIA: ICD-10-CM

## 2023-12-30 DIAGNOSIS — Z72.0 TOBACCO ABUSE: ICD-10-CM

## 2023-12-30 DIAGNOSIS — E78.49 FAMILIAL COMBINED HYPERLIPIDEMIA: ICD-10-CM

## 2023-12-30 DIAGNOSIS — E78.49 FAMILIAL COMBINED HYPERLIPIDEMIA: Primary | ICD-10-CM

## 2023-12-30 DIAGNOSIS — E78.5 HYPERLIPIDEMIA, UNSPECIFIED HYPERLIPIDEMIA TYPE: ICD-10-CM

## 2023-12-30 LAB
ALBUMIN SERPL BCP-MCNC: 4.6 G/DL (ref 3.5–5)
ALP SERPL-CCNC: 62 U/L (ref 34–104)
ALT SERPL W P-5'-P-CCNC: 52 U/L (ref 7–52)
ANION GAP SERPL CALCULATED.3IONS-SCNC: 7 MMOL/L
AST SERPL W P-5'-P-CCNC: 25 U/L (ref 13–39)
BASOPHILS # BLD AUTO: 0.1 THOUSANDS/ÂΜL (ref 0–0.1)
BASOPHILS NFR BLD AUTO: 1 % (ref 0–1)
BILIRUB SERPL-MCNC: 0.55 MG/DL (ref 0.2–1)
BUN SERPL-MCNC: 15 MG/DL (ref 5–25)
CALCIUM SERPL-MCNC: 9.7 MG/DL (ref 8.4–10.2)
CHLORIDE SERPL-SCNC: 105 MMOL/L (ref 96–108)
CHOLEST SERPL-MCNC: 135 MG/DL
CO2 SERPL-SCNC: 28 MMOL/L (ref 21–32)
CREAT SERPL-MCNC: 1.11 MG/DL (ref 0.6–1.3)
CREAT UR-MCNC: 243 MG/DL
EOSINOPHIL # BLD AUTO: 0.44 THOUSAND/ÂΜL (ref 0–0.61)
EOSINOPHIL NFR BLD AUTO: 4 % (ref 0–6)
ERYTHROCYTE [DISTWIDTH] IN BLOOD BY AUTOMATED COUNT: 12.7 % (ref 11.6–15.1)
EST. AVERAGE GLUCOSE BLD GHB EST-MCNC: 137 MG/DL
GFR SERPL CREATININE-BSD FRML MDRD: 73 ML/MIN/1.73SQ M
GLUCOSE P FAST SERPL-MCNC: 132 MG/DL (ref 65–99)
HBA1C MFR BLD: 6.4 %
HCT VFR BLD AUTO: 49.7 % (ref 36.5–49.3)
HDLC SERPL-MCNC: 36 MG/DL
HGB BLD-MCNC: 16.7 G/DL (ref 12–17)
IMM GRANULOCYTES # BLD AUTO: 0.05 THOUSAND/UL (ref 0–0.2)
IMM GRANULOCYTES NFR BLD AUTO: 0 % (ref 0–2)
LDLC SERPL CALC-MCNC: 77 MG/DL (ref 0–100)
LYMPHOCYTES # BLD AUTO: 2.43 THOUSANDS/ÂΜL (ref 0.6–4.47)
LYMPHOCYTES NFR BLD AUTO: 22 % (ref 14–44)
MCH RBC QN AUTO: 33.1 PG (ref 26.8–34.3)
MCHC RBC AUTO-ENTMCNC: 33.6 G/DL (ref 31.4–37.4)
MCV RBC AUTO: 99 FL (ref 82–98)
MICROALBUMIN UR-MCNC: 8.4 MG/L
MICROALBUMIN/CREAT 24H UR: 3 MG/G CREATININE (ref 0–30)
MONOCYTES # BLD AUTO: 0.61 THOUSAND/ÂΜL (ref 0.17–1.22)
MONOCYTES NFR BLD AUTO: 5 % (ref 4–12)
NEUTROPHILS # BLD AUTO: 7.68 THOUSANDS/ÂΜL (ref 1.85–7.62)
NEUTS SEG NFR BLD AUTO: 68 % (ref 43–75)
NONHDLC SERPL-MCNC: 99 MG/DL
NRBC BLD AUTO-RTO: 0 /100 WBCS
PLATELET # BLD AUTO: 221 THOUSANDS/UL (ref 149–390)
PMV BLD AUTO: 10.3 FL (ref 8.9–12.7)
POTASSIUM SERPL-SCNC: 4.5 MMOL/L (ref 3.5–5.3)
PROT SERPL-MCNC: 7.3 G/DL (ref 6.4–8.4)
PSA SERPL-MCNC: 0.4 NG/ML (ref 0–4)
RBC # BLD AUTO: 5.04 MILLION/UL (ref 3.88–5.62)
SODIUM SERPL-SCNC: 140 MMOL/L (ref 135–147)
TRIGL SERPL-MCNC: 111 MG/DL
VIT B12 SERPL-MCNC: 696 PG/ML (ref 180–914)
WBC # BLD AUTO: 11.31 THOUSAND/UL (ref 4.31–10.16)

## 2023-12-30 PROCEDURE — G0103 PSA SCREENING: HCPCS

## 2023-12-30 PROCEDURE — 36415 COLL VENOUS BLD VENIPUNCTURE: CPT

## 2023-12-30 PROCEDURE — 80061 LIPID PANEL: CPT

## 2023-12-30 PROCEDURE — 82607 VITAMIN B-12: CPT

## 2023-12-30 PROCEDURE — 82570 ASSAY OF URINE CREATININE: CPT

## 2023-12-30 PROCEDURE — 82043 UR ALBUMIN QUANTITATIVE: CPT

## 2023-12-30 PROCEDURE — 85025 COMPLETE CBC W/AUTO DIFF WBC: CPT

## 2023-12-30 PROCEDURE — 80053 COMPREHEN METABOLIC PANEL: CPT

## 2023-12-30 PROCEDURE — 83036 HEMOGLOBIN GLYCOSYLATED A1C: CPT

## 2024-01-05 ENCOUNTER — HOSPITAL ENCOUNTER (OUTPATIENT)
Dept: RADIOLOGY | Facility: HOSPITAL | Age: 59
End: 2024-01-05
Payer: COMMERCIAL

## 2024-01-05 ENCOUNTER — APPOINTMENT (OUTPATIENT)
Dept: LAB | Facility: HOSPITAL | Age: 59
End: 2024-01-05
Attending: INTERNAL MEDICINE
Payer: COMMERCIAL

## 2024-01-05 DIAGNOSIS — J45.909 INTRINSIC ASTHMA WITHOUT STATUS ASTHMATICUS WITHOUT COMPLICATION, UNSPECIFIED ASTHMA SEVERITY: ICD-10-CM

## 2024-01-05 DIAGNOSIS — D72.829 LEUKOCYTOSIS, UNSPECIFIED TYPE: ICD-10-CM

## 2024-01-05 LAB
BASOPHILS # BLD AUTO: 0.11 THOUSANDS/ÂΜL (ref 0–0.1)
BASOPHILS NFR BLD AUTO: 1 % (ref 0–1)
EOSINOPHIL # BLD AUTO: 0.46 THOUSAND/ÂΜL (ref 0–0.61)
EOSINOPHIL NFR BLD AUTO: 5 % (ref 0–6)
ERYTHROCYTE [DISTWIDTH] IN BLOOD BY AUTOMATED COUNT: 12.6 % (ref 11.6–15.1)
HCT VFR BLD AUTO: 53.3 % (ref 36.5–49.3)
HGB BLD-MCNC: 17.8 G/DL (ref 12–17)
IMM GRANULOCYTES # BLD AUTO: 0.06 THOUSAND/UL (ref 0–0.2)
IMM GRANULOCYTES NFR BLD AUTO: 1 % (ref 0–2)
LYMPHOCYTES # BLD AUTO: 3.16 THOUSANDS/ÂΜL (ref 0.6–4.47)
LYMPHOCYTES NFR BLD AUTO: 31 % (ref 14–44)
MCH RBC QN AUTO: 33.1 PG (ref 26.8–34.3)
MCHC RBC AUTO-ENTMCNC: 33.4 G/DL (ref 31.4–37.4)
MCV RBC AUTO: 99 FL (ref 82–98)
MONOCYTES # BLD AUTO: 0.58 THOUSAND/ÂΜL (ref 0.17–1.22)
MONOCYTES NFR BLD AUTO: 6 % (ref 4–12)
NEUTROPHILS # BLD AUTO: 5.78 THOUSANDS/ÂΜL (ref 1.85–7.62)
NEUTS SEG NFR BLD AUTO: 56 % (ref 43–75)
NRBC BLD AUTO-RTO: 0 /100 WBCS
PLATELET # BLD AUTO: 258 THOUSANDS/UL (ref 149–390)
PMV BLD AUTO: 10.3 FL (ref 8.9–12.7)
RBC # BLD AUTO: 5.38 MILLION/UL (ref 3.88–5.62)
WBC # BLD AUTO: 10.15 THOUSAND/UL (ref 4.31–10.16)

## 2024-01-05 PROCEDURE — 85025 COMPLETE CBC W/AUTO DIFF WBC: CPT

## 2024-01-05 PROCEDURE — 36415 COLL VENOUS BLD VENIPUNCTURE: CPT

## 2024-01-05 PROCEDURE — 71046 X-RAY EXAM CHEST 2 VIEWS: CPT

## 2024-02-14 ENCOUNTER — CONSULT (OUTPATIENT)
Dept: PULMONOLOGY | Facility: CLINIC | Age: 59
End: 2024-02-14
Payer: COMMERCIAL

## 2024-02-14 VITALS
SYSTOLIC BLOOD PRESSURE: 128 MMHG | HEART RATE: 87 BPM | OXYGEN SATURATION: 97 % | HEIGHT: 76 IN | BODY MASS INDEX: 38.36 KG/M2 | WEIGHT: 315 LBS | DIASTOLIC BLOOD PRESSURE: 70 MMHG | TEMPERATURE: 98.5 F

## 2024-02-14 DIAGNOSIS — Z87.891 PERSONAL HISTORY OF TOBACCO USE: Primary | ICD-10-CM

## 2024-02-14 DIAGNOSIS — G47.30 SLEEP APNEA, UNSPECIFIED: ICD-10-CM

## 2024-02-14 DIAGNOSIS — J41.0 SIMPLE CHRONIC BRONCHITIS (HCC): ICD-10-CM

## 2024-02-14 PROBLEM — E11.69 HYPERLIPIDEMIA ASSOCIATED WITH TYPE 2 DIABETES MELLITUS: Status: ACTIVE | Noted: 2024-02-14

## 2024-02-14 PROBLEM — E66.9 DIABETES MELLITUS TYPE 2 IN OBESE: Status: ACTIVE | Noted: 2024-02-14

## 2024-02-14 PROBLEM — I10 ESSENTIAL HYPERTENSION: Status: ACTIVE | Noted: 2024-02-14

## 2024-02-14 PROBLEM — E11.69 DIABETES MELLITUS TYPE 2 IN OBESE: Status: ACTIVE | Noted: 2024-02-14

## 2024-02-14 PROBLEM — E78.5 HYPERLIPIDEMIA ASSOCIATED WITH TYPE 2 DIABETES MELLITUS: Status: ACTIVE | Noted: 2024-02-14

## 2024-02-14 PROCEDURE — 99244 OFF/OP CNSLTJ NEW/EST MOD 40: CPT | Performed by: INTERNAL MEDICINE

## 2024-02-14 RX ORDER — VARENICLINE TARTRATE 0.5 (11)-1
KIT ORAL
COMMUNITY
Start: 2023-12-28

## 2024-02-14 NOTE — ASSESSMENT & PLAN NOTE
Some symptoms of sleep apnea with perhaps some daytime sleepiness at the end of the work shift, some snoring and restless sleep.  Patient also seems to be at risk of this disorder because of the large neck size.  Home study requested.  We talked about treatment with auto CPAP and patient had some questions about inspire device.  Note that patient's wife is on CPAP although she is not very compliant with this treatment by her report.

## 2024-02-14 NOTE — ASSESSMENT & PLAN NOTE
Smoking 2 pack of cigarettes daily since age 20.  Did previously use varenicline on 1 occasion and stopped smoking for 8 months.  Talked at some length about restarting this drug because it was helpful for him and possibly using electronic cigarettes which was recently shown to be somewhat beneficial in helping patients stop tobacco cigarettes.  Lung cancer screening is appropriate for this patient with calculated pack years of 76.  I ordered this for him.

## 2024-02-14 NOTE — PROGRESS NOTES
Assessment/Plan:    Sleep apnea, unspecified  Some symptoms of sleep apnea with perhaps some daytime sleepiness at the end of the work shift, some snoring and restless sleep.  Patient also seems to be at risk of this disorder because of the large neck size.  Home study requested.  We talked about treatment with auto CPAP and patient had some questions about inspire device.  Note that patient's wife is on CPAP although she is not very compliant with this treatment by her report.    Simple chronic bronchitis (HCC)  Chronic cough with sputum primarily in the morning.  Patient otherwise does not have major symptoms of dyspnea but does report some occasional wheeze.  He has been using daily Breo but does not sense much improvement with this drug.  Note the patient continues to smoke 2 packs of cigarettes daily.    Personal history of tobacco use  Smoking 2 pack of cigarettes daily since age 20.  Did previously use varenicline on 1 occasion and stopped smoking for 8 months.  Talked at some length about restarting this drug because it was helpful for him and possibly using electronic cigarettes which was recently shown to be somewhat beneficial in helping patients stop tobacco cigarettes.  Lung cancer screening is appropriate for this patient with calculated pack years of 76.  I ordered this for him.     Diagnoses and all orders for this visit:    Personal history of tobacco use  -     CT lung screening program; Future    Sleep apnea, unspecified  -     Ambulatory Referral to Pulmonology  -     Ambulatory Referral to Sleep Medicine; Future    Simple chronic bronchitis (HCC)    Other orders  -     Varenicline Tartrate, Starter, 0.5 MG X 11 & 1 MG X 42 TBPK;  (Patient not taking: Reported on 2/14/2024)          Subjective:      Patient ID: Calvin Weiss is a 58 y.o. male.    This patient is referred by Dr. Funk for suspected sleep apnea.  Patient senses that his sleep is quite restful.  He does frequently fall asleep after  working an 8-hour shift as a .  Sometimes falls asleep before supper and sometimes after supper.  Frequently falls asleep in the lazy boy at bedtime.  However feel the sleepiness interferes with his ability to work does not have trouble his nonwork days.  No morning headache.  No significant nocturia.  His wife states that he snores to some degree but I gather not seriously.  Episodes of obstructed airway is not clearly noted.  Patient is very restless when he sleeps however.  Has a neck size of 19-1/2 or 20 inches based on his dress shirt size.  Has gained weight in the last year.  No previous weights available in this electronic record.  Patient smokes 2 packs of cigarettes daily.  He does not sense overt dyspnea.  Chronic cough with sputum in the morning.  Occasional wheezing.  Has been on Breo for a number of months without clear benefit according to him.  Does not have a rescue inhaler.  PFTs in 2021 showed air trapping and some bronchodilator improvement but no overt airflow obstruction.  Previously stopped smoking for 8 months using Chantix.  Has this drug available to him but has not restarted.  I tried to motivate him towards smoking cessation and we talked about electronic cigarettes as being an alternative.  Last had lung cancer screening in June 2022 and I reordered that for him.        The following portions of the patient's history were reviewed and updated as appropriate: allergies, current medications, past family history, past medical history, past social history, past surgical history and problem list.    Review of Systems   Constitutional:  Positive for unexpected weight change. Negative for activity change.        Unknown amount of weight gain   HENT:  Negative for congestion and sinus pressure.    Respiratory:  Positive for apnea, cough and wheezing. Negative for shortness of breath.         Apnea-maybe   Cardiovascular:  Negative for chest pain, palpitations and leg swelling.  "  Gastrointestinal:  Negative for abdominal pain.   Endocrine: Negative for cold intolerance and heat intolerance.   Genitourinary:  Negative for difficulty urinating and enuresis.   Musculoskeletal:  Positive for arthralgias.        Bilateral knees   Allergic/Immunologic: Negative for environmental allergies.   Neurological:  Negative for headaches.         Objective:      /70 (BP Location: Left arm, Patient Position: Sitting, Cuff Size: Standard)   Pulse 87   Temp 98.5 °F (36.9 °C) (Tympanic)   Ht 6' 4\" (1.93 m)   Wt (!) 157 kg (345 lb 9.6 oz)   SpO2 97%   BMI 42.07 kg/m²          Physical Exam  Vitals reviewed.   Constitutional:       General: He is not in acute distress.     Appearance: He is obese. He is not ill-appearing.   HENT:      Nose:      Comments: Septal deviation to the right     Mouth/Throat:      Mouth: Mucous membranes are moist.      Pharynx: Oropharynx is clear.      Comments: Normal sized pharynx  Cardiovascular:      Rate and Rhythm: Normal rate and regular rhythm.      Pulses:           Radial pulses are 2+ on the right side and 2+ on the left side.      Heart sounds: Normal heart sounds.   Pulmonary:      Effort: Pulmonary effort is normal.      Breath sounds: Normal breath sounds. No wheezing or rhonchi.   Musculoskeletal:         General: No swelling.      Cervical back: No rigidity or tenderness.      Right lower leg: No edema.      Left lower leg: No edema.   Lymphadenopathy:      Cervical: No cervical adenopathy.   Skin:     General: Skin is warm and dry.   Neurological:      Mental Status: He is alert and oriented to person, place, and time.   Psychiatric:         Mood and Affect: Mood normal.         Behavior: Behavior normal.         "

## 2024-02-14 NOTE — ASSESSMENT & PLAN NOTE
Chronic cough with sputum primarily in the morning.  Patient otherwise does not have major symptoms of dyspnea but does report some occasional wheeze.  He has been using daily Breo but does not sense much improvement with this drug.  Note the patient continues to smoke 2 packs of cigarettes daily.

## 2024-02-16 DIAGNOSIS — G47.30 SLEEP APNEA, UNSPECIFIED TYPE: Primary | ICD-10-CM

## 2024-03-27 ENCOUNTER — HOSPITAL ENCOUNTER (OUTPATIENT)
Dept: SLEEP CENTER | Facility: CLINIC | Age: 59
Discharge: HOME | End: 2024-03-27
Payer: COMMERCIAL

## 2024-03-27 DIAGNOSIS — G47.30 SLEEP APNEA, UNSPECIFIED TYPE: ICD-10-CM

## 2024-03-27 PROCEDURE — G0399 HOME SLEEP TEST/TYPE 3 PORTA: HCPCS

## 2024-03-27 NOTE — PROGRESS NOTES
Home Sleep Study Documentation    HOME STUDY DEVICE: Noxturnal no                                           Kalpana G3 yes      Pre-Sleep Home Study:    Set-up and instructions performed by: Symone    Technician performed demonstration for Patient: yes    Return demonstration performed by Patient: yes    Written instructions provided to Patient: yes    Patient signed consent form: yes        Post-Sleep Home Study:    Additional comments by Patient: None    Home Sleep Study Failed:no:    Failure reason: N/A    Reported or Detected: N/A    Scored by: DANIELLE Banegas

## 2024-03-28 ENCOUNTER — HOSPITAL ENCOUNTER (OUTPATIENT)
Dept: CT IMAGING | Facility: HOSPITAL | Age: 59
End: 2024-03-28
Attending: INTERNAL MEDICINE
Payer: COMMERCIAL

## 2024-03-28 DIAGNOSIS — Z87.891 PERSONAL HISTORY OF TOBACCO USE: ICD-10-CM

## 2024-03-28 PROCEDURE — 71271 CT THORAX LUNG CANCER SCR C-: CPT

## 2024-03-30 PROCEDURE — G0399 HOME SLEEP TEST/TYPE 3 PORTA: HCPCS | Performed by: INTERNAL MEDICINE

## 2024-04-25 NOTE — PROGRESS NOTES
Assessment:  1. Primary osteoarthritis of right knee  Large joint arthrocentesis: R knee      2. Baker's cyst of knee, right          Patient Active Problem List   Diagnosis   • Acute bronchitis   • Personal history of tobacco use   • Simple chronic bronchitis (HCC)   • Gastroesophageal reflux disease with esophagitis without hemorrhage   • Redundant colon   • Esophageal polyp   • Hx of adenomatous colonic polyps   • Maciel's esophagus without dysplasia   • Overweight   • Arthritis of knee, left   • Cellulitis of left elbow   • Olecranon bursitis, left elbow   • Primary osteoarthritis of right knee   • Baker's cyst of knee, right       Plan:    66 y.o. male with mild right knee arthritis affecting the medial and patellofemoral compartments    • Corticosteroid injection was offered, accepted, and administered into the right knee joint. Risk benefits and alternatives were discussed prior to injection. Patient tolerated the procedure well. • Referral to dr Jason Hui for [address] guided baker cyst aspiration  • Briefly discussed medial  brace in future  • Briefly discussed possibility of visco in future he will call and let us know        The patient was seen and examined by Dr. Loyce Cowden and myself. The assessment and plan were formulated by Dr. Loyce Cowden and I assisted in carrying it out. Subjective:   Patient ID: Titi Young is a 66 y.o. male . HPI    Patient comes in today with regards to right knee pain what the phone. Patient is referred to us by Kevin Barrera MD for further evaluation. The patient reports that the pain been going on for 6 weeks. Patient rates their pain as 2/10. Injury or trauma prior to onset of pain: None. Pain is located in the medial and anterior knee. It is worsened with first getting up from seated position, going downstairs, and is made better with rest.  Treatments tried: motrin . The pain does not radiate . Old injuries or prior surgeries: none.  Numbness or tingling: none Called pt and the pharm is giving him a hard time. He would like you to call the pharm because their telling him they don't have it. And then he would like you to call him because he's confused on this process.     .      The following portions of the patient's history were reviewed and updated as appropriate: allergies, current medications, past family history, past social history, past surgical history and problem list.    Social History     Socioeconomic History   • Marital status: /Civil Union     Spouse name: Not on file   • Number of children: Not on file   • Years of education: Not on file   • Highest education level: Not on file   Occupational History   • Not on file   Tobacco Use   • Smoking status: Every Day     Packs/day: 2.00     Years: 36.00     Total pack years: 72.00     Types: Cigarettes     Start date: 3/10/1984   • Smokeless tobacco: Never   Vaping Use   • Vaping Use: Never used   Substance and Sexual Activity   • Alcohol use: Yes     Comment: occ   • Drug use: Never   • Sexual activity: Not on file   Other Topics Concern   • Not on file   Social History Narrative   • Not on file     Social Determinants of Health     Financial Resource Strain: Not on file   Food Insecurity: Not on file   Transportation Needs: Not on file   Physical Activity: Not on file   Stress: Not on file   Social Connections: Not on file   Intimate Partner Violence: Not on file   Housing Stability: Not on file     Past Medical History:   Diagnosis Date   • Maciel's esophagus    • Colon polyp    • Cough 06/11/2021    had PFT due to 30 year plus smoker - uses Breo Inhaler for maintenance   • Diabetes mellitus ([address])     type 2, accu check 113 at 0700 on 6/11/21   • Esophageal polyp 06/11/2021   • GERD (gastroesophageal reflux disease)    • Hypertension      Past Surgical History:   Procedure Laterality Date   • COLONOSCOPY     • UPPER GASTROINTESTINAL ENDOSCOPY       No Known Allergies  Current Outpatient Medications on File Prior to Visit   Medication Sig Dispense Refill   • atorvastatin (LIPITOR) 40 mg tablet      • fluticasone-vilanterol (BREO ELLIPTA) 200-25 MCG/INH inhaler      • hydrochlorothiazide (HYDRODIURIL) 25 mg tablet Take 25 mg by mouth daily     • losartan (COZAAR) 100 MG tablet Take 100 mg by mouth daily     • meloxicam (Mobic) 15 mg tablet Take 1 tablet (15 mg total) by mouth daily As needed (Patient not taking: No sig reported) 30 tablet 1   • metFORMIN (GLUCOPHAGE) 1000 MG tablet Take 1,000 mg by mouth 2 (two) times a day     • Multiple Vitamins-Minerals (multivitamin with minerals) tablet Take 1 tablet by mouth daily     • nystatin (MYCOSTATIN) 500,000 units/5 mL suspension Apply 5 mL (500,000 Units total) to the mouth or throat 4 (four) times a day (Patient not taking: Reported on 3/21/2022 ) 150 mL 0   • oxyCODONE (ROXICODONE) 5 immediate release tablet      • Ozempic, 0.25 or 0.5 MG/DOSE, 2 MG/1.5ML SOPN USE AS DIRECTED- 0.5MG ONCE WEEKLY     • pantoprazole (PROTONIX) 40 mg tablet TAKE 1 TABLET (40 MG TOTAL) BY MOUTH DAILY 90 tablet 1   • Semaglutide (OZEMPIC, 0.25 OR 0.5 MG/DOSE, SC)      • Xarelto 10 MG tablet        No current facility-administered medications on file prior to visit. Review of Systems      Objective: There were no vitals filed for this visit. Physical Exam  Musculoskeletal:      Right knee: Effusion (small) present. Instability Tests: Medial Wen test negative and lateral Wen test negative. Right Knee Exam     Tenderness   The patient is experiencing tenderness in the medial joint line and patella. Range of Motion   Extension:  -5 abnormal   Flexion: normal     Tests   Wen:  Medial - negative Lateral - negative  Varus: negative Valgus: negative    Other   Erythema: absent  Scars: absent  Sensation: normal  Pulse: present  Swelling: mild  Effusion: effusion (small) present    Comments:  Palpable popliteal cyst  Positive patellar grind test            I have personally reviewed pertinent films in PACS and my interpretation is XR R knee done today shows no fracture, mild effusion, mild DJD patellar and medial compartments.     Large joint arthrocentesis: R knee  Universal Protocol:  Consent given by: patient  Time out: Immediately prior to procedure a "time out" was called to verify the correct patient, procedure, equipment, support staff and site/side marked as required. Site marked: the operative site was marked  Supporting Documentation  Indications: pain   Procedure Details  Location: knee - R knee  Preparation: Patient was prepped and draped in the usual sterile fashion  Needle size: 22 G  Approach: anterolateral  Medications administered: 2 mL lidocaine 1 %; 12 mg betamethasone acetate-betamethasone sodium phosphate 6 (3-3) mg/mL; 1 mL bupivacaine 0.25 %    Patient tolerance: patient tolerated the procedure well with no immediate complications  Dressing:  Sterile dressing applied            Scribe Attestation    I,:  Gerber Lucia PA-C am acting as a scribe while in the presence of the attending physician.:       I,:  Terri Manzo DO personally performed the services described in this documentation    as scribed in my presence.:             Portions of the record may have been created with voice recognition software. Occasional wrong word or "sound a like" substitutions may have occurred due to the inherent limitations of voice recognition software. Read the chart carefully and recognize, using context, where substitutions have occurred.

## 2024-04-26 ENCOUNTER — TELEPHONE (OUTPATIENT)
Dept: SLEEP CENTER | Facility: CLINIC | Age: 59
End: 2024-04-26

## 2024-04-26 NOTE — TELEPHONE ENCOUNTER
Home sleep study resulted, does not demonstrate JANE.  Patient to follow up with Dr. Ewing.    Call placed to patient, left call back message.    [employer] message sent providing results and recommendations.

## 2024-05-06 ENCOUNTER — OFFICE VISIT (OUTPATIENT)
Dept: OBGYN CLINIC | Facility: CLINIC | Age: 59
End: 2024-05-06
Payer: COMMERCIAL

## 2024-05-06 VITALS — BODY MASS INDEX: 38.36 KG/M2 | WEIGHT: 315 LBS | HEIGHT: 76 IN

## 2024-05-06 DIAGNOSIS — M17.12 ARTHRITIS OF LEFT KNEE: Primary | ICD-10-CM

## 2024-05-06 DIAGNOSIS — E11.69 TYPE 2 DIABETES MELLITUS WITH OBESITY  (HCC): ICD-10-CM

## 2024-05-06 DIAGNOSIS — E66.9 TYPE 2 DIABETES MELLITUS WITH OBESITY  (HCC): ICD-10-CM

## 2024-05-06 PROCEDURE — 99213 OFFICE O/P EST LOW 20 MIN: CPT | Performed by: ORTHOPAEDIC SURGERY

## 2024-05-06 PROCEDURE — 20610 DRAIN/INJ JOINT/BURSA W/O US: CPT | Performed by: ORTHOPAEDIC SURGERY

## 2024-05-06 RX ORDER — BETAMETHASONE SODIUM PHOSPHATE AND BETAMETHASONE ACETATE 3; 3 MG/ML; MG/ML
6 INJECTION, SUSPENSION INTRA-ARTICULAR; INTRALESIONAL; INTRAMUSCULAR; SOFT TISSUE
Status: COMPLETED | OUTPATIENT
Start: 2024-05-06 | End: 2024-05-06

## 2024-05-06 RX ORDER — BUPIVACAINE HYDROCHLORIDE 2.5 MG/ML
1 INJECTION, SOLUTION EPIDURAL; INFILTRATION; INTRACAUDAL
Status: COMPLETED | OUTPATIENT
Start: 2024-05-06 | End: 2024-05-06

## 2024-05-06 RX ORDER — HYALURONATE SODIUM 10 MG/ML
20 SYRINGE (ML) INTRAARTICULAR ONCE
Status: DISCONTINUED | OUTPATIENT
Start: 2024-05-06 | End: 2024-05-06

## 2024-05-06 RX ADMIN — BUPIVACAINE HYDROCHLORIDE 1 ML: 2.5 INJECTION, SOLUTION EPIDURAL; INFILTRATION; INTRACAUDAL at 10:00

## 2024-05-06 RX ADMIN — BETAMETHASONE SODIUM PHOSPHATE AND BETAMETHASONE ACETATE 6 MG: 3; 3 INJECTION, SUSPENSION INTRA-ARTICULAR; INTRALESIONAL; INTRAMUSCULAR; SOFT TISSUE at 10:00

## 2024-05-06 NOTE — PROGRESS NOTES
Assessment:  1. Arthritis of left knee          Patient Active Problem List   Diagnosis    Acute bronchitis    Personal history of tobacco use    Simple chronic bronchitis (HCC)    Gastroesophageal reflux disease with esophagitis without hemorrhage    Redundant colon    Esophageal polyp    Hx of adenomatous colonic polyps    Maciel's esophagus without dysplasia    Overweight    Arthritis of knee, left    Cellulitis of left elbow    Olecranon bursitis, left elbow    Primary osteoarthritis of right knee    Baker's cyst of knee, right    Essential hypertension    Hyperlipidemia associated with type 2 diabetes mellitus  (HCC)    Type 2 diabetes mellitus with obesity  (HCC)    Sleep apnea, unspecified           Plan      Cortisone injection will be given today  We will order Monovisc for him and he will come back once we have that approval he has not had 1 since 2022.            Subjective:     Patient ID:    Chief Complaint:Calvin Weiss 58 y.o. male      HPI    Patient comes in today with regards to his left knee.  He reports he has been having pain for the last week or so.  The pain can be as high as 7 out of 10 as low as 10.  He does report that increased activity seems to aggravate it she knows anything straight helps it.  He has taken for relief.      The following portions of the patient's history were reviewed and updated as appropriate: allergies, current medications, past family history, past social history, past surgical history and problem list.    All organ systems normal    Social History     Socioeconomic History    Marital status: /Civil Union     Spouse name: Not on file    Number of children: Not on file    Years of education: Not on file    Highest education level: Not on file   Occupational History    Not on file   Tobacco Use    Smoking status: Every Day     Current packs/day: 2.00     Average packs/day: 2.0 packs/day for 40.2 years (80.3 ttl pk-yrs)     Types: Cigarettes     Start date:  3/10/1984    Smokeless tobacco: Never   Vaping Use    Vaping status: Never Used   Substance and Sexual Activity    Alcohol use: Yes     Comment: occ    Drug use: Never    Sexual activity: Not on file   Other Topics Concern    Not on file   Social History Narrative    Not on file     Social Determinants of Health     Financial Resource Strain: Not on file   Food Insecurity: Not on file   Transportation Needs: Not on file   Physical Activity: Not on file   Stress: Not on file   Social Connections: Not on file   Intimate Partner Violence: Not on file   Housing Stability: Not on file     Past Medical History:   Diagnosis Date    Maciel's esophagus     Colon polyp     Cough 06/11/2021    had PFT due to 30 year plus smoker - uses Breo Inhaler for maintenance    Diabetes mellitus (HCC)     type 2, accu check 113 at 0700 on 6/11/21    Esophageal polyp 06/11/2021    GERD (gastroesophageal reflux disease)     Hypertension      Past Surgical History:   Procedure Laterality Date    COLONOSCOPY      UPPER GASTROINTESTINAL ENDOSCOPY       No Known Allergies  Current Outpatient Medications on File Prior to Visit   Medication Sig Dispense Refill    atorvastatin (LIPITOR) 40 mg tablet       fluticasone-vilanterol (BREO ELLIPTA) 200-25 MCG/INH inhaler       hydrochlorothiazide (HYDRODIURIL) 25 mg tablet Take 25 mg by mouth daily      losartan (COZAAR) 100 MG tablet Take 100 mg by mouth daily      meloxicam (Mobic) 15 mg tablet Take 1 tablet (15 mg total) by mouth daily As needed (Patient not taking: No sig reported) 30 tablet 1    metFORMIN (GLUCOPHAGE) 1000 MG tablet Take 1,000 mg by mouth 2 (two) times a day      Multiple Vitamins-Minerals (multivitamin with minerals) tablet Take 1 tablet by mouth daily      Ozempic, 0.25 or 0.5 MG/DOSE, 2 MG/1.5ML SOPN USE AS DIRECTED- 0.5MG ONCE WEEKLY      pantoprazole (PROTONIX) 40 mg tablet TAKE 1 TABLET (40 MG TOTAL) BY MOUTH DAILY 90 tablet 1    Semaglutide (OZEMPIC, 0.25 OR 0.5 MG/DOSE,  "SC)       Varenicline Tartrate, Starter, 0.5 MG X 11 & 1 MG X 42 TBPK  (Patient not taking: Reported on 2/14/2024)      Xarelto 10 MG tablet        No current facility-administered medications on file prior to visit.              Objective:    Large joint arthrocentesis: L knee  Universal Protocol:  Consent given by: patient  Time out: Immediately prior to procedure a \"time out\" was called to verify the correct patient, procedure, equipment, support staff and site/side marked as required.  Supporting Documentation  Indications: pain   Procedure Details  Location: knee - L knee  Needle size: 22 G  Ultrasound guidance: no  Approach: anterolateral  Medications administered: 6 mg betamethasone acetate-betamethasone sodium phosphate 6 (3-3) mg/mL; 1 mL bupivacaine (PF) 0.25 %              Ortho Exam    No erythema no ecchymosis except on his shins.  Patient does have a small effusion but no erythema.  His range of motion is within normal limits he does have tenderness over the superior lateral aspect of the patella.        Portions of the record may have been created with voice recognition software.  Occasional wrong word or \"sound a like\" substitutions may have occurred due to the inherent limitations of voice recognition software.  Read the chart carefully and recognize, using context, where substitutions have occurred.  "

## 2024-05-14 ENCOUNTER — TELEPHONE (OUTPATIENT)
Dept: GASTROENTEROLOGY | Facility: CLINIC | Age: 59
End: 2024-05-14

## 2024-05-14 NOTE — TELEPHONE ENCOUNTER
Lonnym for pt to call back . He is due for an egd but may be on a blood thinner. If he calls back please ask him oa questions and if he fails please schedule him for the first available office visit. Thanks Jenny

## 2024-05-15 ENCOUNTER — OFFICE VISIT (OUTPATIENT)
Dept: PULMONOLOGY | Facility: CLINIC | Age: 59
End: 2024-05-15
Payer: COMMERCIAL

## 2024-05-15 VITALS
OXYGEN SATURATION: 98 % | DIASTOLIC BLOOD PRESSURE: 72 MMHG | RESPIRATION RATE: 18 BRPM | HEART RATE: 82 BPM | SYSTOLIC BLOOD PRESSURE: 120 MMHG | HEIGHT: 76 IN | TEMPERATURE: 97.5 F | WEIGHT: 315 LBS | BODY MASS INDEX: 38.36 KG/M2

## 2024-05-15 DIAGNOSIS — Z87.891 PERSONAL HISTORY OF TOBACCO USE: Primary | ICD-10-CM

## 2024-05-15 DIAGNOSIS — G47.33 OBSTRUCTIVE SLEEP APNEA: ICD-10-CM

## 2024-05-15 PROCEDURE — 99212 OFFICE O/P EST SF 10 MIN: CPT | Performed by: INTERNAL MEDICINE

## 2024-05-15 NOTE — ASSESSMENT & PLAN NOTE
Patient has not made any progress with smoking cessation still smoking approximately 40 cigarettes/day.  I told him that on his CT scan there is evidence of early emphysema.  Obviously it would be in his best interest to stop smoking.  He states that he previously took Chantix with some benefit.  He would like to stop smoking along with his wife who obviously also smokes and has underlying heart disease.  He will speak with her and contact me should he be willing and able to start on varenicline plus nicotine patch.  We briefly discussed electronic cigarettes as a possible alternative.  Patient advised that lung cancer screening did not show any evidence of lung cancer or suspicious nodules.Repeat next March.

## 2024-05-15 NOTE — PROGRESS NOTES
Assessment/Plan:    Personal history of tobacco use  Patient has not made any progress with smoking cessation still smoking approximately 40 cigarettes/day.  I told him that on his CT scan there is evidence of early emphysema.  Obviously it would be in his best interest to stop smoking.  He states that he previously took Chantix with some benefit.  He would like to stop smoking along with his wife who obviously also smokes and has underlying heart disease.  He will speak with her and contact me should he be willing and able to start on varenicline plus nicotine patch.  We briefly discussed electronic cigarettes as a possible alternative.  Patient advised that lung cancer screening did not show any evidence of lung cancer or suspicious nodules.Repeat next March.    Obstructive sleep apnea  Home sleep testing did not show evidence of significant sleep apnea.  Today patient's symptoms did not suggest that this was a false negative test.  I told him the only reason that we would consider doing in lab testing would be if his wife was to observe significant signs of obstructive airway events while sleeping.  I will delete this diagnosis from his chart.     Diagnoses and all orders for this visit:    Personal history of tobacco use  -     CT lung screening program; Future    Obstructive sleep apnea          Subjective:      Patient ID: Calvin Weiss is a 58 y.o. male.    This patient returns for review of testing and discussion of tobacco use.  Patient's home sleep study did not show evidence of significant sleep apnea.  Today on review of symptoms patient does not seem to indicate that he has significant daytime sleepiness.  His wife does not accompany him to discuss any nighttime observations.  I told him there is a possibility of a false negative test if patient did not sleep well when the home testing was done.  We will consider retesting if his wife observes significant episodes of obstructive airway events at  night.  Lung cancer screening did not show evidence of suspicious nodules or anything else suggesting lung cancer.  There is however early structural emphysema.  Patient so advised.  He has not make any progress towards smoking cessation and smoked 40 cigarettes yesterday.  He is well aware of the ideal need to stop smoking.  He previously got benefit from Chantix.  We discussed the possible combination of varenicline and nicotine patch.  We discussed the alternative of electronic cigarettes.  Patient would like to potentially stop smoking with medication if his wife is also agreeable to attempt to stop.  10-minute visit all with discussion    primary symptoms  Associated symptoms include myalgias. Pertinent negatives include no chest pain, fever, headaches or sore throat.   Answers submitted by the patient for this visit:  Pulmonology Questionnaire (Submitted on 5/14/2024)  Chief Complaint: Primary symptoms  Chronicity: new  When did you first notice your symptoms?: more than 1 month ago  How often do your symptoms occur?: intermittently  Since you first noticed this problem, how has it changed?: unchanged  Do you have shortness of breath that occurs with effort or exertion?: Yes  Do you have ear congestion?: No  Do you have heartburn?: No  Do you have fatigue?: No  Do you have nasal congestion?: No  Do you have shortness of breath when lying flat?: No  Do you have shortness of breath when you wake up?: No  Do you have sweats?: No  Have you experienced weight loss?: No  Which of the following makes your symptoms worse?: strenuous activity  Which of the following makes your symptoms better?: rest  Risk factors for lung disease: smoking/tobacco exposure      The following portions of the patient's history were reviewed and updated as appropriate: allergies, current medications, past family history, past medical history, past social history, past surgical history and problem list.    Review of Systems  "  Constitutional:  Negative for activity change, appetite change, fever and unexpected weight change.   HENT:  Negative for ear pain, postnasal drip, rhinorrhea, sneezing, sore throat and trouble swallowing.    Respiratory:  Positive for wheezing. Negative for apnea and shortness of breath.    Cardiovascular:  Negative for chest pain.   Musculoskeletal:  Positive for myalgias.   Neurological:  Negative for headaches.         Objective:      /72 (BP Location: Left arm, Patient Position: Sitting)   Pulse 82   Temp 97.5 °F (36.4 °C) (Tympanic)   Resp 18   Ht 6' 4\" (1.93 m)   Wt (!) 157 kg (347 lb)   SpO2 98%   BMI 42.24 kg/m²          Physical Exam  Vitals reviewed.   Constitutional:       General: He is not in acute distress.     Appearance: He is obese. He is not ill-appearing.   Neurological:      Mental Status: He is alert and oriented to person, place, and time.   Psychiatric:         Mood and Affect: Mood normal.         Behavior: Behavior normal.         "

## 2024-05-15 NOTE — ASSESSMENT & PLAN NOTE
Home sleep testing did not show evidence of significant sleep apnea.  Today patient's symptoms did not suggest that this was a false negative test.  I told him the only reason that we would consider doing in lab testing would be if his wife was to observe significant signs of obstructive airway events while sleeping.  I will delete this diagnosis from his chart.

## 2024-05-21 ENCOUNTER — HOSPITAL ENCOUNTER (OUTPATIENT)
Dept: VASCULAR ULTRASOUND | Facility: HOSPITAL | Age: 59
Discharge: HOME | End: 2024-05-21
Payer: COMMERCIAL

## 2024-05-21 DIAGNOSIS — I80.02 PHLEBITIS AND THROMBOPHLEBITIS OF SUPERFICIAL VESSELS OF LEFT LOWER EXTREMITY: ICD-10-CM

## 2024-05-21 PROCEDURE — 93971 EXTREMITY STUDY: CPT

## 2024-05-22 PROCEDURE — 93971 EXTREMITY STUDY: CPT | Performed by: SURGERY

## 2024-08-02 NOTE — PROGRESS NOTES
Assessment:  1  Left knee pain, unspecified chronicity     2  Patellofemoral disorder of left knee     3  Primary osteoarthritis of left knee       Patient Active Problem List   Diagnosis    Acute bronchitis    Personal history of tobacco use    Simple chronic bronchitis (HCC)    Gastroesophageal reflux disease with esophagitis without hemorrhage    Redundant colon    Esophageal polyp    Hx of adenomatous colonic polyps    Maciel's esophagus without dysplasia    Overweight           Plan  - Corticosteroid injection tolerated well  Post injection protocol advised  -Visco series approval submitted  -Continue PT and Cervantes taping  - will follow up once injections are in            Subjective:     Patient ID:    Chief Tori HALL April Hiss 49 y o  male left knee pain  Patient was last seen 3/4/2022 where we discussed Cervantes taping which he states is helping  We also referred him to physical therapy which he reports is only mildly helping  HPI    Patient comes in today with regards to left knee pain  The patient reports that the pain is in the anterior knee and has been going on chronicaly  The pain is rated at 0 at its best and 4 at its worst   The pain is described as ache  It is worsened with knee flexion, and is made better with Cervantes taping    The patient has tried visco injections in the past      The following portions of the patient's history were reviewed and updated as appropriate: allergies, current medications, past family history, past social history, past surgical history and problem list         Social History     Socioeconomic History    Marital status: /Civil Union     Spouse name: Not on file    Number of children: Not on file    Years of education: Not on file    Highest education level: Not on file   Occupational History    Not on file   Tobacco Use    Smoking status: Current Every Day Smoker     Packs/day: 2 00     Years: 36 00     Pack years: 72 00 Types: Cigarettes     Start date: 3/10/1984    Smokeless tobacco: Never Used   Vaping Use    Vaping Use: Never used   Substance and Sexual Activity    Alcohol use:  Yes    Drug use: Never    Sexual activity: Not on file   Other Topics Concern    Not on file   Social History Narrative    Not on file     Social Determinants of Health     Financial Resource Strain: Not on file   Food Insecurity: Not on file   Transportation Needs: Not on file   Physical Activity: Not on file   Stress: Not on file   Social Connections: Not on file   Intimate Partner Violence: Not on file   Housing Stability: Not on file     Past Medical History:   Diagnosis Date    Maciel's esophagus     Colon polyp     Cough 06/11/2021    had PFT due to 30 year plus smoker - uses Breo Inhaler for maintenance    Diabetes mellitus ([facility] [address] )     type 2, accu check 113 at 0700 on 6/11/21    Esophageal polyp 06/11/2021    GERD (gastroesophageal reflux disease)     Hypertension      Past Surgical History:   Procedure Laterality Date    COLONOSCOPY      UPPER GASTROINTESTINAL ENDOSCOPY       No Known Allergies  Current Outpatient Medications on File Prior to Visit   Medication Sig Dispense Refill    atorvastatin (LIPITOR) 40 mg tablet       fluticasone-vilanterol (BREO ELLIPTA) 200-25 MCG/INH inhaler       hydrochlorothiazide (HYDRODIURIL) 25 mg tablet Take 25 mg by mouth daily      losartan (COZAAR) 100 MG tablet Take 100 mg by mouth daily      meloxicam (Mobic) 15 mg tablet Take 1 tablet (15 mg total) by mouth daily As needed 30 tablet 1    metFORMIN (GLUCOPHAGE) 1000 MG tablet Take 1,000 mg by mouth 2 (two) times a day      Multiple Vitamins-Minerals (multivitamin with minerals) tablet Take 1 tablet by mouth daily      nystatin (MYCOSTATIN) 500,000 units/5 mL suspension Apply 5 mL (500,000 Units total) to the mouth or throat 4 (four) times a day (Patient not taking: Reported on 3/21/2022 ) 150 mL 0    Ozempic, 0 25 or 0 5 MG/DOSE, 2 MG/1 5ML SOPN USE AS DIRECTED- 0 5MG ONCE WEEKLY      pantoprazole (PROTONIX) 20 mg tablet TAKE ONE TABLET "ONE-HALF"-HOUR BEFORE BREAKFAST AND IN THE EVENING 60 tablet 1    Semaglutide (OZEMPIC, 0 25 OR 0 5 MG/DOSE, SC)        No current facility-administered medications on file prior to visit  Objective:    Review of Systems   Constitutional: Negative for chills and fever  HENT: Negative for ear pain and sore throat  Eyes: Negative for pain and visual disturbance  Respiratory: Negative for cough and shortness of breath  Cardiovascular: Negative for chest pain and palpitations  Gastrointestinal: Negative for abdominal pain and vomiting  Genitourinary: Negative for dysuria and hematuria  Musculoskeletal: Negative for arthralgias and back pain  Skin: Negative for color change and rash  Neurological: Negative for seizures and syncope  All other systems reviewed and are negative  Left Knee Exam     Other   Erythema: absent  Sensation: normal  Swelling: none  Effusion: no effusion present            Physical Exam  HENT:      Head: Normocephalic  Eyes:      Pupils: Pupils are equal, round, and reactive to light  Cardiovascular:      Pulses: Normal pulses  Pulmonary:      Effort: Pulmonary effort is normal    Musculoskeletal:         General: Normal range of motion  Left knee: No effusion  Skin:     General: Skin is warm and dry  Neurological:      General: No focal deficit present  Mental Status: He is alert  Psychiatric:         Behavior: Behavior normal          Large joint arthrocentesis: L knee  Universal Protocol:  Consent: Verbal consent obtained    Risks and benefits: risks, benefits and alternatives were discussed  Consent given by: patient  Patient understanding: patient states understanding of the procedure being performed  Patient identity confirmed: verbally with patient    Supporting Documentation  Indications: pain and diagnostic evaluation Instructions: This plan will send the code FBSD to the PM system.  DO NOT or CHANGE the price. Price (Do Not Change): 0.00 Procedure Details  Location: knee - L knee  Preparation: Patient was prepped and draped in the usual sterile fashion  Needle size: 22 G  Ultrasound guidance: no  Medications administered: 1 mL bupivacaine 0 25 %; 6 mg betamethasone acetate-betamethasone sodium phosphate 6 (3-3) mg/mL; 0 5 mL lidocaine (PF) 2 %                    Portions of the record may have been created with voice recognition software   Occasional wrong word or "sound a like" substitutions may have occurred due to the inherent limitations of voice recognition software   Read the chart carefully and recognize, using context, where substitutions have occurred  Detail Level: Simple

## 2024-09-06 ENCOUNTER — OFFICE VISIT (OUTPATIENT)
Dept: OBGYN CLINIC | Facility: CLINIC | Age: 59
End: 2024-09-06
Payer: COMMERCIAL

## 2024-09-06 ENCOUNTER — APPOINTMENT (OUTPATIENT)
Dept: RADIOLOGY | Facility: AMBULARY SURGERY CENTER | Age: 59
End: 2024-09-06
Attending: ORTHOPAEDIC SURGERY
Payer: COMMERCIAL

## 2024-09-06 VITALS
HEART RATE: 73 BPM | BODY MASS INDEX: 38.36 KG/M2 | SYSTOLIC BLOOD PRESSURE: 131 MMHG | WEIGHT: 315 LBS | HEIGHT: 76 IN | DIASTOLIC BLOOD PRESSURE: 74 MMHG

## 2024-09-06 DIAGNOSIS — M79.671 PAIN IN RIGHT FOOT: ICD-10-CM

## 2024-09-06 DIAGNOSIS — M72.2: ICD-10-CM

## 2024-09-06 DIAGNOSIS — M79.671 PAIN IN RIGHT FOOT: Primary | ICD-10-CM

## 2024-09-06 PROCEDURE — 99214 OFFICE O/P EST MOD 30 MIN: CPT | Performed by: ORTHOPAEDIC SURGERY

## 2024-09-06 PROCEDURE — 73630 X-RAY EXAM OF FOOT: CPT

## 2024-09-06 NOTE — PROGRESS NOTES
Assessment:  1. Pain in right foot  XR foot 3+ vw right        Patient Active Problem List   Diagnosis    Acute bronchitis    Personal history of tobacco use    Simple chronic bronchitis (HCC)    Gastroesophageal reflux disease with esophagitis without hemorrhage    Redundant colon    Esophageal polyp    Hx of adenomatous colonic polyps    Maciel's esophagus without dysplasia    Overweight    Arthritis of left knee    Cellulitis of left elbow    Olecranon bursitis, left elbow    Primary osteoarthritis of right knee    Baker's cyst of knee, right    Essential hypertension    Hyperlipidemia associated with type 2 diabetes mellitus  (HCC)    Type 2 diabetes mellitus with obesity  (HCC)           Plan      Fu with physical therapy plantar fascial splint follow-up with us in 6 weeks if he still having pain we will do a plantar fascial injection.            Subjective:     Patient ID:    Chief Complaint:Calvin Weiss 59 y.o. male      HPI    Patient comes in with regards to his right foot.  He has pain when he steps on it in the morning.  Pain is on the bottom of the foot and extends to the posterior aspect of the heel.  Been going on for couple weeks now.      The following portions of the patient's history were reviewed and updated as appropriate: allergies, current medications, past family history, past social history, past surgical history and problem list.        Social History     Socioeconomic History    Marital status: /Civil Union     Spouse name: Not on file    Number of children: Not on file    Years of education: Not on file    Highest education level: Not on file   Occupational History    Not on file   Tobacco Use    Smoking status: Every Day     Current packs/day: 2.00     Average packs/day: 2.0 packs/day for 40.5 years (81.0 ttl pk-yrs)     Types: Cigarettes     Start date: 3/10/1984    Smokeless tobacco: Never   Vaping Use    Vaping status: Never Used   Substance and Sexual Activity    Alcohol use:  Yes     Comment: occ    Drug use: Never    Sexual activity: Not on file   Other Topics Concern    Not on file   Social History Narrative    Not on file     Social Determinants of Health     Financial Resource Strain: Not on file   Food Insecurity: Not on file   Transportation Needs: Not on file   Physical Activity: Not on file   Stress: Not on file   Social Connections: Not on file   Intimate Partner Violence: Not on file   Housing Stability: Not on file     Past Medical History:   Diagnosis Date    Maciel's esophagus     Colon polyp     Cough 06/11/2021    had PFT due to 30 year plus smoker - uses Breo Inhaler for maintenance    Diabetes mellitus (HCC)     type 2, accu check 113 at 0700 on 6/11/21    Esophageal polyp 06/11/2021    GERD (gastroesophageal reflux disease)     Hypertension      Past Surgical History:   Procedure Laterality Date    COLONOSCOPY      UPPER GASTROINTESTINAL ENDOSCOPY       No Known Allergies  Current Outpatient Medications on File Prior to Visit   Medication Sig Dispense Refill    atorvastatin (LIPITOR) 40 mg tablet       hydrochlorothiazide (HYDRODIURIL) 25 mg tablet Take 25 mg by mouth daily      losartan (COZAAR) 100 MG tablet Take 100 mg by mouth daily      metFORMIN (GLUCOPHAGE) 1000 MG tablet Take 1,000 mg by mouth 2 (two) times a day      Multiple Vitamins-Minerals (multivitamin with minerals) tablet Take 1 tablet by mouth daily      Ozempic, 0.25 or 0.5 MG/DOSE, 2 MG/1.5ML SOPN USE AS DIRECTED- 0.5MG ONCE WEEKLY      pantoprazole (PROTONIX) 40 mg tablet TAKE 1 TABLET (40 MG TOTAL) BY MOUTH DAILY 90 tablet 1    fluticasone-vilanterol (BREO ELLIPTA) 200-25 MCG/INH inhaler       meloxicam (Mobic) 15 mg tablet Take 1 tablet (15 mg total) by mouth daily As needed (Patient not taking: No sig reported) 30 tablet 1    Semaglutide (OZEMPIC, 0.25 OR 0.5 MG/DOSE, SC)       Varenicline Tartrate, Starter, 0.5 MG X 11 & 1 MG X 42 TBPK  (Patient not taking: Reported on 2/14/2024)      Xarelto 10  "MG tablet        No current facility-administered medications on file prior to visit.              Objective:    Review of Systems   Constitutional: Negative.    HENT: Negative.     Eyes: Negative.    Respiratory: Negative.     Cardiovascular: Negative.    Gastrointestinal: Negative.  Negative for vomiting.   Genitourinary: Negative.    Musculoskeletal:         Please refer to HPI   Skin: Negative.    Neurological: Negative.    Hematological: Negative.    Psychiatric/Behavioral: Negative.     All other systems reviewed and are negative.      Ortho Exam    Physical Exam  Vitals and nursing note reviewed.   Constitutional:       Appearance: He is well-developed.   HENT:      Head: Normocephalic.   Eyes:      Pupils: Pupils are equal, round, and reactive to light.   Pulmonary:      Effort: Pulmonary effort is normal.   Abdominal:      General: There is no distension.   Musculoskeletal:      Cervical back: Neck supple.   Skin:     General: Skin is warm.   Neurological:      Mental Status: He is alert and oriented to person, place, and time.         Procedures  No Procedures performed today    I have personally reviewed pertinent films in PACS.  Mild pes planus      Portions of the record may have been created with voice recognition software.  Occasional wrong word or \"sound a like\" substitutions may have occurred due to the inherent limitations of voice recognition software.  Read the chart carefully and recognize, using context, where substitutions have occurred.  "

## 2024-09-11 ENCOUNTER — EVALUATION (OUTPATIENT)
Dept: PHYSICAL THERAPY | Facility: REHABILITATION | Age: 59
End: 2024-09-11
Payer: COMMERCIAL

## 2024-09-11 DIAGNOSIS — M79.671 PAIN IN RIGHT FOOT: ICD-10-CM

## 2024-09-11 DIAGNOSIS — M72.2: ICD-10-CM

## 2024-09-11 PROCEDURE — 97140 MANUAL THERAPY 1/> REGIONS: CPT | Performed by: PHYSICAL THERAPIST

## 2024-09-11 PROCEDURE — 97162 PT EVAL MOD COMPLEX 30 MIN: CPT | Performed by: PHYSICAL THERAPIST

## 2024-09-11 NOTE — PROGRESS NOTES
PT Evaluation     Today's date: 2024  Patient name: Calvin Weiss  : 1965  MRN: [id number]  Referring provider: William Villegas DO  Dx:   Encounter Diagnosis     ICD-10-CM    1. Pain in right foot  M79.671 Ambulatory Referral to Physical Therapy      2. Contracture, fascia, plantar  M72.2 Ambulatory Referral to Physical Therapy                     Assessment  Impairments: abnormal coordination, abnormal gait, abnormal muscle firing, abnormal or restricted ROM, abnormal movement, activity intolerance, impaired balance, impaired physical strength, lacks appropriate home exercise program, pain with function and weight-bearing intolerance    Assessment details: Patient presents to PT with R foot pain consistent with plantar fasciitis. Patient displays decreased ankle ROM, ankle strength and calf tightness. These impairments are leading to pain with walking, standing and stairs. Patient will benefit from skilled PT to address above impairments and help them return to PLOF.  Barriers to therapy: Physical job  Understanding of Dx/Px/POC: good     Prognosis: good    Goals  STG  1. Patient will display decreased pain to 0-2 in 6 weeks  2. Patient will display ankle ROM WNL in 6 weeks  3. Patient will display ankle strength WNL in 6 weeks    LTG  1. Patient will be able to stand for 30 minutes without pain in 12 weeks  2. Patient will be able to walk for 30 minutes without pain in 12 weeks  3. Patient will be able to go up/down 3 flights of stairs without pain in 12 weeks    Plan  Patient would benefit from: PT eval and skilled physical therapy  Referral necessary: Yes  Planned modality interventions: TENS, cryotherapy, thermotherapy: hydrocollator packs and traction    Planned therapy interventions: manual therapy, joint mobilization, massage, motor coordination training, neuromuscular re-education, patient education, strengthening, stretching, therapeutic activities, therapeutic exercise, therapeutic training, home  exercise program, graded exercise, graded activity, gait training, functional ROM exercises, flexibility, fine motor coordination training, balance/weight bearing training, balance, ADL training and activity modification    Frequency: 2x week  Duration in weeks: 6  Plan of Care beginning date: 2024  Plan of Care expiration date: 10/23/2024  Treatment plan discussed with: patient        Subjective Evaluation    History of Present Illness  Mechanism of injury: Patient states his R foot has been bothering him for about a month now. He has a lot of pain with the first step in the morning. Patient has been using a night splint. Patient works as a . Patient also has L knee pain that is being treated with injections          Not a recurrent problem   Quality of life: fair    Patient Goals  Patient goals for therapy: decreased edema, decreased pain, increased motion and increased strength    Pain  Current pain ratin  At best pain ratin  At worst pain ratin  Quality: sharp  Relieving factors: ice and medications  Aggravating factors: stair climbing, walking and standing    Social Support  Steps to enter house: no    Employment status: working        Objective     Active Range of Motion     Right Ankle/Foot   Dorsiflexion (ke): 0 degrees   Plantar flexion: WFL  Inversion: WFL  Eversion: WFL    Passive Range of Motion     Right Ankle/Foot    Dorsiflexion (ke): 0 degrees   Plantar flexion: WFL  Inversion: WFL  Eversion: WFL    Strength/Myotome Testing     Right Ankle/Foot   Dorsiflexion: 4  Plantar flexion: 4  Inversion: 4  Eversion: 4    General Comments:      Ankle/Foot Comments   Plantar fascia insertion TTP on R  Medial gastroc TTP             Precautions:       Manuals             IASTM done                                                   Neuro Re-Ed             balance                                                                                           Ther Ex             Calf  stretch                                                                                                        Ther Activity             Heel raises                          Gait Training                                       Modalities

## 2024-09-16 ENCOUNTER — PROCEDURE VISIT (OUTPATIENT)
Dept: OBGYN CLINIC | Facility: CLINIC | Age: 59
End: 2024-09-16
Payer: COMMERCIAL

## 2024-09-16 ENCOUNTER — OFFICE VISIT (OUTPATIENT)
Dept: PHYSICAL THERAPY | Facility: REHABILITATION | Age: 59
End: 2024-09-16
Payer: COMMERCIAL

## 2024-09-16 VITALS — BODY MASS INDEX: 38.36 KG/M2 | WEIGHT: 315 LBS | HEIGHT: 76 IN

## 2024-09-16 DIAGNOSIS — M72.2: ICD-10-CM

## 2024-09-16 DIAGNOSIS — M17.12 ARTHRITIS OF LEFT KNEE: Primary | ICD-10-CM

## 2024-09-16 DIAGNOSIS — M79.671 PAIN IN RIGHT FOOT: Primary | ICD-10-CM

## 2024-09-16 PROCEDURE — 97110 THERAPEUTIC EXERCISES: CPT | Performed by: PHYSICAL THERAPIST

## 2024-09-16 PROCEDURE — 20610 DRAIN/INJ JOINT/BURSA W/O US: CPT | Performed by: ORTHOPAEDIC SURGERY

## 2024-09-16 PROCEDURE — 97140 MANUAL THERAPY 1/> REGIONS: CPT | Performed by: PHYSICAL THERAPIST

## 2024-09-16 PROCEDURE — 97530 THERAPEUTIC ACTIVITIES: CPT | Performed by: PHYSICAL THERAPIST

## 2024-09-16 NOTE — PROGRESS NOTES
Daily Note     Today's date: 2024  Patient name: Calvin Weiss  : 1965  MRN: [id number]  Referring provider: William Villegas DO  Dx:   Encounter Diagnosis     ICD-10-CM    1. Pain in right foot  M79.671       2. Contracture, fascia, plantar  M72.2                      Subjective: patient states he is still having some pain but it more of an ache now      Objective: See treatment diary below      Assessment: Tolerated treatment well. Patient demonstrated fatigue post treatment, exhibited good technique with therapeutic exercises, and would benefit from continued PT Patient with tightness of medial gastroc. Patient with good tolerance to initiation of treatment      Plan: Continue per plan of care.      Precautions:       Manuals            IASTM done done           STM calf                                       Neuro Re-Ed             balance                                                                                           Ther Ex             Calf stretch  4x20s                                                                                                      Ther Activity             Heel raises  3x10                        Gait Training                                       Modalities

## 2024-09-20 ENCOUNTER — OFFICE VISIT (OUTPATIENT)
Dept: PHYSICAL THERAPY | Facility: REHABILITATION | Age: 59
End: 2024-09-20
Payer: COMMERCIAL

## 2024-09-20 DIAGNOSIS — M79.671 PAIN IN RIGHT FOOT: Primary | ICD-10-CM

## 2024-09-20 DIAGNOSIS — M72.2: ICD-10-CM

## 2024-09-20 PROCEDURE — 97140 MANUAL THERAPY 1/> REGIONS: CPT | Performed by: PHYSICAL THERAPIST

## 2024-09-20 PROCEDURE — 97110 THERAPEUTIC EXERCISES: CPT | Performed by: PHYSICAL THERAPIST

## 2024-09-20 PROCEDURE — 97530 THERAPEUTIC ACTIVITIES: CPT | Performed by: PHYSICAL THERAPIST

## 2024-09-20 NOTE — PROGRESS NOTES
Daily Note     Today's date: 2024  Patient name: Calvin Weiss  : 1965  MRN: [id number]  Referring provider: William Villegas DO  Dx:   Encounter Diagnosis     ICD-10-CM    1. Pain in right foot  M79.671       2. Contracture, fascia, plantar  M72.2                      Subjective: patient states the pain is improving but it hasn't gone away yet      Objective: See treatment diary below      Assessment: Tolerated treatment well. Patient demonstrated fatigue post treatment, exhibited good technique with therapeutic exercises, and would benefit from continued PT Patient with improving symptoms. Patient continues to have some significant tightness in his medial gastroc      Plan: Continue per plan of care.      Precautions:       Manuals           IASTM done done done          STM calf                                       Neuro Re-Ed             balance   Tandem airex 3x20s                                                                                        Ther Ex             Calf stretch  4x20s 4x20s          Tennis ball   2 min                                                                                        Ther Activity             Heel raises  3x10 3x10                       Gait Training                                       Modalities

## 2024-09-23 ENCOUNTER — OFFICE VISIT (OUTPATIENT)
Dept: PHYSICAL THERAPY | Facility: REHABILITATION | Age: 59
End: 2024-09-23
Payer: COMMERCIAL

## 2024-09-23 DIAGNOSIS — M72.2: ICD-10-CM

## 2024-09-23 DIAGNOSIS — M79.671 PAIN IN RIGHT FOOT: Primary | ICD-10-CM

## 2024-09-23 PROCEDURE — 97140 MANUAL THERAPY 1/> REGIONS: CPT | Performed by: PHYSICAL THERAPIST

## 2024-09-23 PROCEDURE — 97530 THERAPEUTIC ACTIVITIES: CPT | Performed by: PHYSICAL THERAPIST

## 2024-09-23 PROCEDURE — 97110 THERAPEUTIC EXERCISES: CPT | Performed by: PHYSICAL THERAPIST

## 2024-09-23 NOTE — PROGRESS NOTES
Daily Note     Today's date: 2024  Patient name: Calvin Weiss  : 1965  MRN: [id number]  Referring provider: William Villegas DO  Dx:   Encounter Diagnosis     ICD-10-CM    1. Pain in right foot  M79.671       2. Contracture, fascia, plantar  M72.2                      Subjective: patient states the pain is improving but it is still there      Objective: See treatment diary below      Assessment: Tolerated treatment well. Patient demonstrated fatigue post treatment, exhibited good technique with therapeutic exercises, and would benefit from continued PT Patient with improving calf tightness leading to less heel pain      Plan: Continue per plan of care.      Precautions:       Manuals          IASTM done done done done         STM calf                                       Neuro Re-Ed             balance   Tandem airex 3x20s Tandem airex 3x20s                                                                                       Ther Ex             Calf stretch  4x20s 4x20s 4x20s         Tennis ball   2 min 2 min         Seated heel raises    20# 3x10                                                                          Ther Activity             Heel raises  3x10 3x10 3x10                      Gait Training                                       Modalities

## 2024-09-27 ENCOUNTER — OFFICE VISIT (OUTPATIENT)
Dept: PHYSICAL THERAPY | Facility: REHABILITATION | Age: 59
End: 2024-09-27
Payer: COMMERCIAL

## 2024-09-27 DIAGNOSIS — M79.671 PAIN IN RIGHT FOOT: Primary | ICD-10-CM

## 2024-09-27 DIAGNOSIS — M72.2: ICD-10-CM

## 2024-09-27 PROCEDURE — 97110 THERAPEUTIC EXERCISES: CPT | Performed by: PHYSICAL THERAPIST

## 2024-09-27 PROCEDURE — 97112 NEUROMUSCULAR REEDUCATION: CPT | Performed by: PHYSICAL THERAPIST

## 2024-09-27 PROCEDURE — 97140 MANUAL THERAPY 1/> REGIONS: CPT | Performed by: PHYSICAL THERAPIST

## 2024-09-27 PROCEDURE — 97530 THERAPEUTIC ACTIVITIES: CPT | Performed by: PHYSICAL THERAPIST

## 2024-09-27 NOTE — PROGRESS NOTES
Daily Note     Today's date: 2024  Patient name: Calvin Weiss  : 1965  MRN: [id number]  Referring provider: William Villegas DO  Dx:   Encounter Diagnosis     ICD-10-CM    1. Pain in right foot  M79.671       2. Contracture, fascia, plantar  M72.2                      Subjective: patient states his pain is still there but it is improving      Objective: See treatment diary below      Assessment: Tolerated treatment well. Patient demonstrated fatigue post treatment, exhibited good technique with therapeutic exercises, and would benefit from continued PT Patient with improving symptoms. Patient continues to display some calf tightness but that is improving       Plan: Continue per plan of care.      Precautions:       Manuals         IASTM done done done done done        STM calf                                       Neuro Re-Ed             balance   Tandem airex 3x20s Tandem airex 3x20s Tandem airex 3x20s                                                                                      Ther Ex             Calf stretch  4x20s 4x20s 4x20s 4x20s        Tennis ball   2 min 2 min 2 min        Seated heel raises    20# 3x10 20# 3x10                                                                         Ther Activity             Heel raises  3x10 3x10 3x10 3x10                     Gait Training                                       Modalities

## 2024-09-30 ENCOUNTER — OFFICE VISIT (OUTPATIENT)
Dept: PHYSICAL THERAPY | Facility: REHABILITATION | Age: 59
End: 2024-09-30
Payer: COMMERCIAL

## 2024-09-30 DIAGNOSIS — M79.671 PAIN IN RIGHT FOOT: Primary | ICD-10-CM

## 2024-09-30 DIAGNOSIS — M72.2: ICD-10-CM

## 2024-09-30 PROCEDURE — 97112 NEUROMUSCULAR REEDUCATION: CPT | Performed by: PHYSICAL THERAPIST

## 2024-09-30 PROCEDURE — 97530 THERAPEUTIC ACTIVITIES: CPT | Performed by: PHYSICAL THERAPIST

## 2024-09-30 PROCEDURE — 97140 MANUAL THERAPY 1/> REGIONS: CPT | Performed by: PHYSICAL THERAPIST

## 2024-09-30 PROCEDURE — 97110 THERAPEUTIC EXERCISES: CPT | Performed by: PHYSICAL THERAPIST

## 2024-09-30 NOTE — PROGRESS NOTES
Daily Note     Today's date: 2024  Patient name: Calvin Weiss  : 1965  MRN: [id number]  Referring provider: William Villegas DO  Dx:   Encounter Diagnosis     ICD-10-CM    1. Pain in right foot  M79.671       2. Contracture, fascia, plantar  M72.2                      Subjective: patient states his foot is getting there but it still gets achy      Objective: See treatment diary below      Assessment: Tolerated treatment well. Patient demonstrated fatigue post treatment, exhibited good technique with therapeutic exercises, and would benefit from continued PT Patient with improving calf tightness. Patient displaying improving symptoms overall      Plan: Continue per plan of care.      Precautions:       Manuals        IASTM done done done done done done       STM calf                                       Neuro Re-Ed             balance   Tandem airex 3x20s Tandem airex 3x20s Tandem airex 3x20s Tandem airex 3x20s                                                                                     Ther Ex             Calf stretch  4x20s 4x20s 4x20s 4x20s 4x20s       Tennis ball   2 min 2 min 2 min 2 min       Seated heel raises    20# 3x10 20# 3x10 25# 3x10                                                                        Ther Activity             Heel raises  3x10 3x10 3x10 3x10 3x10                    Gait Training                                       Modalities

## 2024-10-04 ENCOUNTER — OFFICE VISIT (OUTPATIENT)
Dept: PHYSICAL THERAPY | Facility: REHABILITATION | Age: 59
End: 2024-10-04
Payer: COMMERCIAL

## 2024-10-04 DIAGNOSIS — M72.2: ICD-10-CM

## 2024-10-04 DIAGNOSIS — M79.671 PAIN IN RIGHT FOOT: Primary | ICD-10-CM

## 2024-10-04 PROCEDURE — 97140 MANUAL THERAPY 1/> REGIONS: CPT | Performed by: PHYSICAL THERAPIST

## 2024-10-04 PROCEDURE — 97112 NEUROMUSCULAR REEDUCATION: CPT | Performed by: PHYSICAL THERAPIST

## 2024-10-04 PROCEDURE — 97110 THERAPEUTIC EXERCISES: CPT | Performed by: PHYSICAL THERAPIST

## 2024-10-04 NOTE — PROGRESS NOTES
Daily Note     Today's date: 10/4/2024  Patient name: Calvin Weiss  : 1965  MRN: [id number]  Referring provider: iWlliam Villegas DO  Dx:   Encounter Diagnosis     ICD-10-CM    1. Pain in right foot  M79.671       2. Contracture, fascia, plantar  M72.2                      Subjective: patient states the pain continues to have less discomfort in his foot but mornings he continues to feel      Objective: See treatment diary below      Assessment: Tolerated treatment well. Patient demonstrated fatigue post treatment, exhibited good technique with therapeutic exercises, and would benefit from continued PT Patient with improving calf tightness leading to less pain in his plantar fascia. Pain is improving but it has not completely subsided      Plan: Continue per plan of care.      Precautions:   PT 1 on 1: 867-178    Manuals 9/11 9/16 9/20 9/23 9/27 9/30 10/4      IASTM done done done done done done done      STM calf                                       Neuro Re-Ed             balance   Tandem airex 3x20s Tandem airex 3x20s Tandem airex 3x20s Tandem airex 3x20s Tandem airex 3x20s                                                                                    Ther Ex             Calf stretch  4x20s 4x20s 4x20s 4x20s 4x20s 4x20s      Tennis ball   2 min 2 min 2 min 2 min 2 min      Seated heel raises    20# 3x10 20# 3x10 25# 3x10 25# 3x10                                                                       Ther Activity             Heel raises  3x10 3x10 3x10 3x10 3x10 3x10                   Gait Training                                       Modalities

## 2024-10-07 ENCOUNTER — OFFICE VISIT (OUTPATIENT)
Dept: PHYSICAL THERAPY | Facility: REHABILITATION | Age: 59
End: 2024-10-07
Payer: COMMERCIAL

## 2024-10-07 DIAGNOSIS — M79.671 PAIN IN RIGHT FOOT: Primary | ICD-10-CM

## 2024-10-07 DIAGNOSIS — M72.2: ICD-10-CM

## 2024-10-07 PROCEDURE — 97140 MANUAL THERAPY 1/> REGIONS: CPT | Performed by: PHYSICAL THERAPIST

## 2024-10-07 PROCEDURE — 97112 NEUROMUSCULAR REEDUCATION: CPT | Performed by: PHYSICAL THERAPIST

## 2024-10-07 PROCEDURE — 97110 THERAPEUTIC EXERCISES: CPT | Performed by: PHYSICAL THERAPIST

## 2024-10-07 NOTE — PROGRESS NOTES
Daily Note     Today's date: 10/7/2024  Patient name: Calvin Weiss  : 1965  MRN: [id number]  Referring provider: William Villegas DO  Dx:   Encounter Diagnosis     ICD-10-CM    1. Pain in right foot  M79.671       2. Contracture, fascia, plantar  M72.2                      Subjective: patient states his foot is feeling better but its not perfect      Objective: See treatment diary below      Assessment: Tolerated treatment well. Patient demonstrated fatigue post treatment, exhibited good technique with therapeutic exercises, and would benefit from continued PT Patient with improving calf tightness leading to less stress on his plantar fascia      Plan: Continue per plan of care.      Precautions:   PT 1 on 1: 161-296    Manuals 9/11 9/16 9/20 9/23 9/27 9/30 10/4 10/7     IASTM done done done done done done done done     STM calf                                       Neuro Re-Ed             balance   Tandem airex 3x20s Tandem airex 3x20s Tandem airex 3x20s Tandem airex 3x20s Tandem airex 3x20s Tandem 3x20s                                                                                   Ther Ex             Calf stretch  4x20s 4x20s 4x20s 4x20s 4x20s 4x20s 4x20s     Tennis ball   2 min 2 min 2 min 2 min 2 min 2 min     Seated heel raises    20# 3x10 20# 3x10 25# 3x10 25# 3x10 25# 3x10                                                                      Ther Activity             Heel raises  3x10 3x10 3x10 3x10 3x10 3x10 3x10                  Gait Training                                       Modalities

## 2024-10-11 ENCOUNTER — OFFICE VISIT (OUTPATIENT)
Dept: PHYSICAL THERAPY | Facility: REHABILITATION | Age: 59
End: 2024-10-11
Payer: COMMERCIAL

## 2024-10-11 DIAGNOSIS — M72.2: ICD-10-CM

## 2024-10-11 DIAGNOSIS — M79.671 PAIN IN RIGHT FOOT: Primary | ICD-10-CM

## 2024-10-11 PROCEDURE — 97110 THERAPEUTIC EXERCISES: CPT | Performed by: PHYSICAL THERAPIST

## 2024-10-11 PROCEDURE — 97112 NEUROMUSCULAR REEDUCATION: CPT | Performed by: PHYSICAL THERAPIST

## 2024-10-11 PROCEDURE — 97140 MANUAL THERAPY 1/> REGIONS: CPT | Performed by: PHYSICAL THERAPIST

## 2024-10-11 NOTE — PROGRESS NOTES
Daily Note     Today's date: 10/11/2024  Patient name: Calvin Weiss  : 1965  MRN: [id number]  Referring provider: William Villegas DO  Dx:   Encounter Diagnosis     ICD-10-CM    1. Pain in right foot  M79.671       2. Contracture, fascia, plantar  M72.2                      Subjective: patient states his foot is feeling a lot better. It still bothers him a little from time to time      Objective: See treatment diary below      Assessment: Tolerated treatment well. Patient demonstrated fatigue post treatment, exhibited good technique with therapeutic exercises, and would benefit from continued PT Patient with improving symptoms. Patient will continue exercises at home and check in if needed      Plan: Continue per plan of care.      Precautions:       Manuals 9/11 9/16 9/20 9/23 9/27 9/30 10/4 10/7 10/11    IASTM done done done done done done done done done    STM calf                                       Neuro Re-Ed             balance   Tandem airex 3x20s Tandem airex 3x20s Tandem airex 3x20s Tandem airex 3x20s Tandem airex 3x20s Tandem 3x20s Tandem 3x20s                                                                                  Ther Ex             Calf stretch  4x20s 4x20s 4x20s 4x20s 4x20s 4x20s 4x20s 4x20s    Tennis ball   2 min 2 min 2 min 2 min 2 min 2 min 2 min    Seated heel raises    20# 3x10 20# 3x10 25# 3x10 25# 3x10 25# 3x10 25# 4x10                                                                     Ther Activity             Heel raises  3x10 3x10 3x10 3x10 3x10 3x10 3x10 3x10                 Gait Training                                       Modalities

## 2024-11-11 ENCOUNTER — OFFICE VISIT (OUTPATIENT)
Dept: GASTROENTEROLOGY | Facility: CLINIC | Age: 59
End: 2024-11-11
Payer: COMMERCIAL

## 2024-11-11 ENCOUNTER — TELEPHONE (OUTPATIENT)
Dept: GASTROENTEROLOGY | Facility: CLINIC | Age: 59
End: 2024-11-11

## 2024-11-11 VITALS
HEIGHT: 76 IN | HEART RATE: 81 BPM | BODY MASS INDEX: 38.36 KG/M2 | SYSTOLIC BLOOD PRESSURE: 122 MMHG | DIASTOLIC BLOOD PRESSURE: 76 MMHG | WEIGHT: 315 LBS

## 2024-11-11 DIAGNOSIS — Q43.8 REDUNDANT COLON: ICD-10-CM

## 2024-11-11 DIAGNOSIS — K21.00 GASTROESOPHAGEAL REFLUX DISEASE WITH ESOPHAGITIS WITHOUT HEMORRHAGE: Primary | ICD-10-CM

## 2024-11-11 DIAGNOSIS — Z86.0101 HX OF ADENOMATOUS COLONIC POLYPS: ICD-10-CM

## 2024-11-11 DIAGNOSIS — K22.70 BARRETT'S ESOPHAGUS WITHOUT DYSPLASIA: ICD-10-CM

## 2024-11-11 PROCEDURE — 99214 OFFICE O/P EST MOD 30 MIN: CPT | Performed by: INTERNAL MEDICINE

## 2024-11-11 RX ORDER — ASPIRIN 81 MG/1
81 TABLET ORAL DAILY
COMMUNITY

## 2024-11-11 RX ORDER — PANTOPRAZOLE SODIUM 40 MG/1
40 TABLET, DELAYED RELEASE ORAL DAILY
Qty: 90 TABLET | Refills: 4 | Status: SHIPPED | OUTPATIENT
Start: 2024-11-11

## 2024-11-11 RX ORDER — SODIUM CHLORIDE, SODIUM LACTATE, POTASSIUM CHLORIDE, CALCIUM CHLORIDE 600; 310; 30; 20 MG/100ML; MG/100ML; MG/100ML; MG/100ML
125 INJECTION, SOLUTION INTRAVENOUS CONTINUOUS
OUTPATIENT
Start: 2024-11-11

## 2024-11-11 RX ORDER — POLYETHYLENE GLYCOL 3350, SODIUM CHLORIDE, SODIUM BICARBONATE, POTASSIUM CHLORIDE 420; 11.2; 5.72; 1.48 G/4L; G/4L; G/4L; G/4L
4000 POWDER, FOR SOLUTION ORAL ONCE
Qty: 4000 ML | Refills: 0 | Status: SHIPPED | OUTPATIENT
Start: 2024-11-11 | End: 2024-11-11

## 2024-11-11 NOTE — PROGRESS NOTES
Ambulatory Visit  Name: Calvin Weiss      : 1965      MRN: [id number]  Encounter Provider: Lorenzo Mosley MD  Encounter Date: 2024   Encounter department: [facility] GASTROENTEROLOGY [address]    Assessment & Plan  Gastroesophageal reflux disease with esophagitis without hemorrhage  History of chronic GERD, taking pantoprazole, side effects of long-term pantoprazole/PPI discussed.  Encouraged to lose some weight minimize coffee which he drinks a lot and smoking.  Schedule EGD, will biopsies for Maciel's at the same time.  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  Bowels are regular.  Schedule colonoscopy, procedure and prep discussed at length.  He had a redundant colon.  Will use large-volume prep.  May be an adult colonoscope discussed with patient he consents.  Orders:    Colonoscopy; Future    polyethylene glycol-electrolytes (NULYTELY) 4000 mL solution; Take 4,000 mL by mouth once for 1 dose    BMI 40.0-44.9, adult (HCC)  Encourage patient to minimize carbs and lose some weight.  Orders:    EGD; Future    Redundant colon           History of Present Illness     Calvin Weiss is a 59 y.o. male who presents ration of his GI symptoms to include heartburn acid reflux indigestion occasional nocturnal reflux and cough, appetite is fair weight stable denies dysphagia GI bleeding melena hematochezia, bowels are regular every day morning, denies any chest pains shortness of breath fever chills rash, does have extensive history, has gained some weight.  Does drink a lot of coffee, smokes cigarettes, eats large portions.  Denies any family history of GI/esophageal malignancies.  [place]  ", lives at home with his wife.  Diet medications some of the current and prior records noted.      Review of Systems   Constitutional:  Negative for fever.   Gastrointestinal:  Positive for abdominal pain. Negative for constipation, diarrhea, nausea and vomiting.   Genitourinary:  Negative for dysuria, frequency and hematuria.   Musculoskeletal:  Positive for arthralgias. Negative for myalgias.   Neurological:  Negative for headaches.   All other systems reviewed and are negative.          Objective     /76 (BP Location: Right arm, Patient Position: Sitting, Cuff Size: Adult)   Pulse 81   Ht 6' 4\" (1.93 m)   Wt (!) 160 kg (352 lb 6.4 oz)   BMI 42.90 kg/m²     Physical Exam  Vitals and nursing note reviewed.   Constitutional:       General: He is not in acute distress.     Appearance: He is well-developed. He is obese.   HENT:      Head: Normocephalic and atraumatic.   Eyes:      Conjunctiva/sclera: Conjunctivae normal.   Cardiovascular:      Rate and Rhythm: Normal rate and regular rhythm.      Heart sounds: No murmur heard.  Pulmonary:      Effort: Pulmonary effort is normal. No respiratory distress.      Breath sounds: Normal breath sounds.   Abdominal:      General: There is distension.      Palpations: Abdomen is soft. There is no mass.      Tenderness: There is no abdominal tenderness. There is no guarding or rebound.   Musculoskeletal:         General: No swelling.      Cervical back: Neck supple.   Skin:     General: Skin is warm and dry.      Capillary Refill: Capillary refill takes less than 2 seconds.      Coloration: Skin is not jaundiced or pale.   Neurological:      Mental Status: He is alert.   Psychiatric:         Mood and Affect: Mood normal.         Answers submitted by the patient for this visit:  Abdominal Pain Questionnaire (Submitted on 11/5/2024)  Chief Complaint: Abdominal pain  Pain - numeric: 0/10  anorexia: No  belching: Yes  hematochezia: No  melena: No  weight loss: " No

## 2024-11-11 NOTE — ASSESSMENT & PLAN NOTE
History of chronic GERD, taking pantoprazole, side effects of long-term pantoprazole/PPI discussed.  Encouraged to lose some weight minimize coffee which he drinks a lot and smoking.  Schedule EGD, will biopsies for Maciel's at the same time.  Orders:    pantoprazole (PROTONIX) 40 mg tablet; Take 1 tablet (40 mg total) by mouth daily    EGD; Future

## 2024-11-11 NOTE — TELEPHONE ENCOUNTER
Scheduled date of EGD/colonoscopy (as of today): 3/4/25  Physician performing EGD/colonoscopy: Melany  Location of EGD/colonoscopy: [facility]  Desired bowel prep reviewed with patient: Golytely  Instructions reviewed with patient by: Mary  Clearances: - Diabetic, ozempoc - 7 day hold

## 2024-11-11 NOTE — ASSESSMENT & PLAN NOTE
History of multiple adenomatous polyps, 5 tubular adenoma 5 hyperplastic removed in December 2020, 3-year follow-up was recommended.  Bowels are regular.  Schedule colonoscopy, procedure and prep discussed at length.  He had a redundant colon.  Will use large-volume prep.  May be an adult colonoscope discussed with patient he consents.  Orders:    Colonoscopy; Future    polyethylene glycol-electrolytes (NULYTELY) 4000 mL solution; Take 4,000 mL by mouth once for 1 dose

## 2025-01-27 ENCOUNTER — APPOINTMENT (EMERGENCY)
Dept: VASCULAR ULTRASOUND | Facility: HOSPITAL | Age: 60
End: 2025-01-27
Payer: COMMERCIAL

## 2025-01-27 ENCOUNTER — HOSPITAL ENCOUNTER (EMERGENCY)
Facility: HOSPITAL | Age: 60
Discharge: HOME | End: 2025-01-27
Attending: EMERGENCY MEDICINE | Admitting: EMERGENCY MEDICINE
Payer: COMMERCIAL

## 2025-01-27 VITALS
TEMPERATURE: 98.5 F | DIASTOLIC BLOOD PRESSURE: 66 MMHG | WEIGHT: 315 LBS | SYSTOLIC BLOOD PRESSURE: 136 MMHG | BODY MASS INDEX: 38.36 KG/M2 | HEIGHT: 76 IN | OXYGEN SATURATION: 97 % | HEART RATE: 82 BPM | RESPIRATION RATE: 17 BRPM

## 2025-01-27 DIAGNOSIS — I80.01 THROMBOPHLEBITIS OF SUPERFICIAL VEINS OF RIGHT LOWER EXTREMITY: Primary | ICD-10-CM

## 2025-01-27 LAB
ANION GAP SERPL CALCULATED.3IONS-SCNC: 9 MMOL/L (ref 4–13)
APTT PPP: 26 SECONDS (ref 23–34)
BASOPHILS # BLD AUTO: 0.12 THOUSANDS/ΜL (ref 0–0.1)
BASOPHILS NFR BLD AUTO: 1 % (ref 0–1)
BUN SERPL-MCNC: 14 MG/DL (ref 5–25)
CALCIUM SERPL-MCNC: 9.5 MG/DL (ref 8.4–10.2)
CHLORIDE SERPL-SCNC: 101 MMOL/L (ref 96–108)
CO2 SERPL-SCNC: 28 MMOL/L (ref 21–32)
CREAT SERPL-MCNC: 1.09 MG/DL (ref 0.6–1.3)
EOSINOPHIL # BLD AUTO: 0.44 THOUSAND/ΜL (ref 0–0.61)
EOSINOPHIL NFR BLD AUTO: 4 % (ref 0–6)
ERYTHROCYTE [DISTWIDTH] IN BLOOD BY AUTOMATED COUNT: 12.6 % (ref 11.6–15.1)
GFR SERPL CREATININE-BSD FRML MDRD: 74 ML/MIN/1.73SQ M
GLUCOSE SERPL-MCNC: 112 MG/DL (ref 65–140)
HCT VFR BLD AUTO: 48.1 % (ref 36.5–49.3)
HGB BLD-MCNC: 15.7 G/DL (ref 12–17)
IMM GRANULOCYTES # BLD AUTO: 0.1 THOUSAND/UL (ref 0–0.2)
IMM GRANULOCYTES NFR BLD AUTO: 1 % (ref 0–2)
INR PPP: 0.98 (ref 0.85–1.19)
LYMPHOCYTES # BLD AUTO: 4.51 THOUSANDS/ΜL (ref 0.6–4.47)
LYMPHOCYTES NFR BLD AUTO: 40 % (ref 14–44)
MCH RBC QN AUTO: 31.8 PG (ref 26.8–34.3)
MCHC RBC AUTO-ENTMCNC: 32.6 G/DL (ref 31.4–37.4)
MCV RBC AUTO: 98 FL (ref 82–98)
MONOCYTES # BLD AUTO: 0.65 THOUSAND/ΜL (ref 0.17–1.22)
MONOCYTES NFR BLD AUTO: 6 % (ref 4–12)
NEUTROPHILS # BLD AUTO: 5.39 THOUSANDS/ΜL (ref 1.85–7.62)
NEUTS SEG NFR BLD AUTO: 48 % (ref 43–75)
NRBC BLD AUTO-RTO: 0 /100 WBCS
PLATELET # BLD AUTO: 243 THOUSANDS/UL (ref 149–390)
PMV BLD AUTO: 10 FL (ref 8.9–12.7)
POTASSIUM SERPL-SCNC: 4.3 MMOL/L (ref 3.5–5.3)
PROTHROMBIN TIME: 13.4 SECONDS (ref 12.3–15)
RBC # BLD AUTO: 4.93 MILLION/UL (ref 3.88–5.62)
SODIUM SERPL-SCNC: 138 MMOL/L (ref 135–147)
WBC # BLD AUTO: 11.21 THOUSAND/UL (ref 4.31–10.16)

## 2025-01-27 PROCEDURE — 99285 EMERGENCY DEPT VISIT HI MDM: CPT | Performed by: PHYSICIAN ASSISTANT

## 2025-01-27 PROCEDURE — 93971 EXTREMITY STUDY: CPT | Performed by: SURGERY

## 2025-01-27 PROCEDURE — 99283 EMERGENCY DEPT VISIT LOW MDM: CPT

## 2025-01-27 PROCEDURE — 85025 COMPLETE CBC W/AUTO DIFF WBC: CPT | Performed by: PHYSICIAN ASSISTANT

## 2025-01-27 PROCEDURE — 85610 PROTHROMBIN TIME: CPT | Performed by: PHYSICIAN ASSISTANT

## 2025-01-27 PROCEDURE — 85730 THROMBOPLASTIN TIME PARTIAL: CPT | Performed by: PHYSICIAN ASSISTANT

## 2025-01-27 PROCEDURE — 93971 EXTREMITY STUDY: CPT

## 2025-01-27 PROCEDURE — 80048 BASIC METABOLIC PNL TOTAL CA: CPT | Performed by: PHYSICIAN ASSISTANT

## 2025-01-27 PROCEDURE — 36415 COLL VENOUS BLD VENIPUNCTURE: CPT | Performed by: PHYSICIAN ASSISTANT

## 2025-01-27 RX ADMIN — APIXABAN 10 MG: 5 TABLET, FILM COATED ORAL at 19:51

## 2025-01-27 NOTE — ED TRIAGE NOTES
Ambulatory w/complaint of sudden onset of right posterior upper leg pain; concerned for blood clot; history of superficial blood clot in right leg x6 months ago; states takes aspirin daily; took coumadin for blood clot 25 years ago for blood clot of right lower leg; denies chest pain and/or shortness of breath; no redness and/or swelling to leg; recent diagnosis of pneumonia x2 weeks ago; denies N/V, fever and/or diarrhea

## 2025-01-28 NOTE — ED PROVIDER NOTES
Time reflects when diagnosis was documented in both MDM as applicable and the Disposition within this note       Time User Action Codes Description Comment    1/27/2025  7:39 PM Carlos Miranda Add [I80.01] Thrombophlebitis of superficial veins of right lower extremity           ED Disposition       ED Disposition   Discharge    Condition   Stable    Date/Time   Mon Jan 27, 2025  7:39 PM    Comment   Calvin Weiss discharge to home/self care.                   Assessment & Plan       Medical Decision Making  59-year-old male presenting to the emergency department today for an area of redness and swelling to the right medial thigh.  Ongoing since yesterday.  He has no chest pain or shortness of breath.  His vital signs are stable.  He is afebrile.  On physical examination, the patient has mild redness with a palpable cord to the medial aspect of the right thigh.  He has a negative Homans' sign bilaterally with 2+ DP pulses bilaterally.  Venous duplex of the right upper extremity is suspicious for superficial thrombophlebitis to the proximal to distal greater saphenous vein.  It does not extend to the CFV.  This was discussed with Dr. Escobar with vascular surgery who recommends 3 to 6 weeks of anticoagulation outpatient.  Labs show stable kidney function.  The patient is stable for discharge at this time.  Risks of anticoagulation therapy at home were discussed with the patient.  He was dosed with Eliquis while here in the emergency department.  Recommended stopping aspirin while taking Eliquis.  Patient is aware to return to the emergency department with any trauma, MVA, head strike, or other things that we will increase his risk of bleeding.  Referral placed for outpatient vascular surgery follow-up.  Return to the emergency department for trauma or worsening symptoms.  Strict return precautions were given.  Recommend PCP follow-up as soon as possible. The patient and/or patient's proxy verify their  "understanding and agree to the plan at this time.  All questions answered to the patient and/or their proxy's satisfaction.  All labs reviewed and utilized in the medical decision making process (if labs were ordered).  Portions of the record may have been created with voice recognition software.  Occasional wrong word or \"sound a like\" substitutions may have occurred due to the inherent limitations of voice recognition software.  Read the chart carefully and recognize, using context, where substitutions have occurred.    I reviewed prior notes.  Case discussed with wife at bedside.    Problems Addressed:  Thrombophlebitis of superficial veins of right lower extremity: undiagnosed new problem with uncertain prognosis    Amount and/or Complexity of Data Reviewed  Independent Historian: spouse  External Data Reviewed: notes.  Labs: ordered. Decision-making details documented in ED Course.  Radiology: ordered. Decision-making details documented in ED Course.  Discussion of management or test interpretation with external provider(s): Dr. Escobar - Vascular Surgery  Akil Richards - Vascular Technician    Risk  Prescription drug management.        ED Course as of 01/27/25 2005 Mon Jan 27, 2025   1830 SC Dr. Escobar, vascular surgeon on call.   1845 I spoke with Dr. Escobar with vascular surgery.  He recommends 3 to 6 weeks of anticoagulation and outpatient follow-up.  Will check basic blood work.  Will continue to monitor.       Medications   apixaban (ELIQUIS) tablet 10 mg (10 mg Oral Given 1/27/25 1951)       ED Risk Strat Scores                          SBIRT 22yo+      Flowsheet Row Most Recent Value   Initial Alcohol Screen: US AUDIT-C     1. How often do you have a drink containing alcohol? 0 Filed at: 01/27/2025 1714   2. How many drinks containing alcohol do you have on a typical day you are drinking?  0 Filed at: 01/27/2025 1714   3a. Male UNDER 65: How often do you have five or more drinks on one occasion? " 0 Filed at: 01/27/2025 1714   Audit-C Score 0 Filed at: 01/27/2025 1714   ALTAF: How many times in the past year have you...    Used an illegal drug or used a prescription medication for non-medical reasons? Never Filed at: 01/27/2025 1714                            History of Present Illness       Chief Complaint   Patient presents with    Leg Pain     Ambulatory w/complaint of sudden onset of right posterior upper leg pain; concerned for blood clot; history of superficial blood clot in right leg x6 months ago; states takes aspirin daily; took coumadin for blood clot 25 years ago for blood clot of right lower leg; denies chest pain and/or shortness of breath; no redness and/or swelling to leg; recent diagnosis of pneumonia x2 weeks ago; denies N/V, fever and/or diarrhea       Past Medical History:   Diagnosis Date    Maciel's esophagus     Colon polyp     Cough 06/11/2021    had PFT due to 30 year plus smoker - uses Breo Inhaler for maintenance    Diabetes mellitus (HCC)     type 2, accu check 113 at 0700 on 6/11/21    Esophageal polyp 06/11/2021    GERD (gastroesophageal reflux disease)     Hypertension       Past Surgical History:   Procedure Laterality Date    COLONOSCOPY      UPPER GASTROINTESTINAL ENDOSCOPY        History reviewed. No pertinent family history.   Social History     Tobacco Use    Smoking status: Every Day     Current packs/day: 2.00     Average packs/day: 2.0 packs/day for 40.9 years (81.8 ttl pk-yrs)     Types: Cigarettes     Start date: 3/10/1984    Smokeless tobacco: Never   Vaping Use    Vaping status: Never Used   Substance Use Topics    Alcohol use: Yes     Comment: occ    Drug use: Never      E-Cigarette/Vaping    E-Cigarette Use Never User       E-Cigarette/Vaping Substances    Nicotine No     THC No     CBD No     Flavoring No     Other No     Unknown No       I have reviewed and agree with the history as documented.     This is a 59-year-old male with past medical history significant  for DVT and superficial thrombophlebitis presenting to the emergency department today for an area of swelling and pain to the right medial thigh.  He notes he noticed this overnight.  He notes mild pain to the region but is still able to walk.  He has a history of a similar event to the left lower extremity approximately 6 months ago.  The patient denies any chest pain or shortness of breath.  He does not take anticoagulants though he does take an aspirin daily.  He has no fevers or chills.  He has no trauma to the region.  He has no nausea or vomiting.  He has no other complaints at this time.      History provided by:  Patient   used: No    Leg Pain  Location:  Leg  Time since incident: since yesterday.  Injury: no    Leg location:  R upper leg  Chronicity:  New  Dislocation: no    Tetanus status:  Unknown  Prior injury to area:  No  Relieved by:  None tried  Worsened by:  Nothing  Ineffective treatments:  None tried  Associated symptoms: swelling    Associated symptoms: no back pain, no decreased ROM, no fatigue, no fever, no itching, no muscle weakness, no neck pain, no numbness, no stiffness and no tingling        Review of Systems   Constitutional:  Negative for appetite change, chills, diaphoresis, fatigue and fever.   Respiratory:  Negative for cough, chest tightness, shortness of breath and wheezing.    Cardiovascular:  Negative for chest pain and palpitations.   Gastrointestinal:  Negative for abdominal pain, constipation, diarrhea, nausea and vomiting.   Musculoskeletal:  Negative for back pain, neck pain, neck stiffness and stiffness.   Skin:  Positive for color change. Negative for itching.   Neurological:  Negative for dizziness, seizures, syncope, weakness, light-headedness, numbness and headaches.   Psychiatric/Behavioral:  Negative for confusion.    All other systems reviewed and are negative.          Objective       ED Triage Vitals   Temperature Pulse Blood Pressure  Respirations SpO2 Patient Position - Orthostatic VS   01/27/25 1712 01/27/25 1712 01/27/25 1716 01/27/25 1712 01/27/25 1712 01/27/25 1712   98.5 °F (36.9 °C) 82 136/66 17 97 % Lying      Temp Source Heart Rate Source BP Location FiO2 (%) Pain Score    01/27/25 1712 01/27/25 1712 01/27/25 1712 -- 01/27/25 1712    Oral Monitor Right arm  4      Vitals      Date and Time Temp Pulse SpO2 Resp BP Pain Score FACES Pain Rating User   01/27/25 1716 -- -- -- -- 136/66 -- -- TB   01/27/25 1712 98.5 °F (36.9 °C) 82 97 % 17 -- 4 -- TB            Physical Exam  Vitals and nursing note reviewed.   Constitutional:       General: He is not in acute distress.     Appearance: Normal appearance. He is obese. He is not ill-appearing, toxic-appearing or diaphoretic.   HENT:      Head: Normocephalic and atraumatic.      Nose: Nose normal. No congestion or rhinorrhea.      Mouth/Throat:      Mouth: Mucous membranes are moist.      Pharynx: No oropharyngeal exudate or posterior oropharyngeal erythema.   Eyes:      General: No scleral icterus.        Right eye: No discharge.         Left eye: No discharge.      Conjunctiva/sclera: Conjunctivae normal.   Cardiovascular:      Rate and Rhythm: Normal rate and regular rhythm.      Pulses: Normal pulses.      Heart sounds: Normal heart sounds. No murmur heard.     No friction rub. No gallop.      Comments: 2+ DP pulses bilaterally.  Pulmonary:      Effort: Pulmonary effort is normal. No respiratory distress.      Breath sounds: Normal breath sounds. No stridor. No wheezing, rhonchi or rales.   Chest:      Chest wall: No tenderness.   Musculoskeletal:         General: Normal range of motion.      Cervical back: Normal range of motion. No rigidity.      Right lower leg: No edema.      Left lower leg: No edema.   Skin:     General: Skin is warm and dry.      Capillary Refill: Capillary refill takes less than 2 seconds.      Coloration: Skin is not jaundiced or pale.      Comments: The patient has  swelling with subtle redness to the right medial thigh.  It is slightly warm to the touch.  There is a palpable cord noted.  He has a negative Homans' sign bilaterally.   Neurological:      General: No focal deficit present.      Mental Status: He is alert and oriented to person, place, and time. Mental status is at baseline.   Psychiatric:         Mood and Affect: Mood normal.         Behavior: Behavior normal.         Results Reviewed       Procedure Component Value Units Date/Time    Basic metabolic panel [[id number]] Collected: 01/27/25 1909    Lab Status: Final result Specimen: Blood from Arm, Right Updated: 01/27/25 1930     Sodium 138 mmol/L      Potassium 4.3 mmol/L      Chloride 101 mmol/L      CO2 28 mmol/L      ANION GAP 9 mmol/L      BUN 14 mg/dL      Creatinine 1.09 mg/dL      Glucose 112 mg/dL      Calcium 9.5 mg/dL      eGFR 74 ml/min/1.73sq m     Narrative:      National Kid[employer] guidelines for Chronic Kidney Disease (CKD):     Stage 1 with normal or high GFR (GFR > 90 mL/min/1.73 square meters)    Stage 2 Mild CKD (GFR = 60-89 mL/min/1.73 square meters)    Stage 3A Moderate CKD (GFR = 45-59 mL/min/1.73 square meters)    Stage 3B Moderate CKD (GFR = 30-44 mL/min/1.73 square meters)    Stage 4 Severe CKD (GFR = 15-29 mL/min/1.73 square meters)    Stage 5 End Stage CKD (GFR <15 mL/min/1.73 square meters)  Note: GFR calculation is accurate only with a steady state creatinine    Protime-INR [[id number]]  (Normal) Collected: 01/27/25 1909    Lab Status: Final result Specimen: Blood from Arm, Right Updated: 01/27/25 1921     Protime 13.4 seconds      INR 0.98    Narrative:      INR Therapeutic Range    Indication                                             INR Range      Atrial Fibrillation                                               2.0-3.0  Hypercoagulable State                                    2.0.2.3  Left Ventricular Asist Device                            2.0-3.0  Mechanical Heart  Valve                                  -    Aortic(with afib, MI, embolism, HF, LA enlargement,    and/or coagulopathy)                                     2.0-3.0 (2.5-3.5)     Mitral                                                             2.5-3.5  Prosthetic/Bioprosthetic Heart Valve               2.0-3.0  Venous thromboembolism (VTE: VT, PE        2.0-3.0    APTT [[id number]]  (Normal) Collected: 01/27/25 1909    Lab Status: Final result Specimen: Blood from Arm, Right Updated: 01/27/25 1921     PTT 26 seconds     CBC and differential [[id number]]  (Abnormal) Collected: 01/27/25 1909    Lab Status: Final result Specimen: Blood from Arm, Right Updated: 01/27/25 1914     WBC 11.21 Thousand/uL      RBC 4.93 Million/uL      Hemoglobin 15.7 g/dL      Hematocrit 48.1 %      MCV 98 fL      MCH 31.8 pg      MCHC 32.6 g/dL      RDW 12.6 %      MPV 10.0 fL      Platelets 243 Thousands/uL      nRBC 0 /100 WBCs      Segmented % 48 %      Immature Grans % 1 %      Lymphocytes % 40 %      Monocytes % 6 %      Eosinophils Relative 4 %      Basophils Relative 1 %      Absolute Neutrophils 5.39 Thousands/µL      Absolute Immature Grans 0.10 Thousand/uL      Absolute Lymphocytes 4.51 Thousands/µL      Absolute Monocytes 0.65 Thousand/µL      Eosinophils Absolute 0.44 Thousand/µL      Basophils Absolute 0.12 Thousands/µL             VAS VENOUS DUPLEX -LOWER LIMB UNILATERAL   Final Interpretation by Lilly Escobar DO (01/27 1839)          Procedures    ED Medication and Procedure Management   Prior to Admission Medications   Prescriptions Last Dose Informant Patient Reported? Taking?   Multiple Vitamins-Minerals (multivitamin with minerals) tablet  Self Yes No   Sig: Take 1 tablet by mouth daily   Ozempic, 0.25 or 0.5 MG/DOSE, 2 MG/1.5ML SOPN  Self Yes No   Sig: USE AS DIRECTED- 0.5MG ONCE WEEKLY   Semaglutide (OZEMPIC, 0.25 OR 0.5 MG/DOSE, SC)  Self Yes No   Varenicline Tartrate, Starter, 0.5 MG X 11 & 1 MG X 42 TBPK  Self  Yes No   Patient not taking: Reported on 2/14/2024   Xarelto 10 MG tablet  Self Yes No   aspirin (Aspirin 81) 81 mg EC tablet  Self Yes No   Sig: Take 81 mg by mouth daily   atorvastatin (LIPITOR) 40 mg tablet  Self Yes No   fluticasone-vilanterol (BREO ELLIPTA) 200-25 MCG/INH inhaler  Self Yes No   hydrochlorothiazide (HYDRODIURIL) 25 mg tablet  Self Yes No   Sig: Take 25 mg by mouth daily   losartan (COZAAR) 100 MG tablet  Self Yes No   Sig: Take 100 mg by mouth daily   meloxicam (Mobic) 15 mg tablet  Self No No   Sig: Take 1 tablet (15 mg total) by mouth daily As needed   Patient not taking: No sig reported   metFORMIN (GLUCOPHAGE) 1000 MG tablet  Self Yes No   Sig: Take 1,000 mg by mouth 2 (two) times a day   pantoprazole (PROTONIX) 40 mg tablet   No No   Sig: Take 1 tablet (40 mg total) by mouth daily   polyethylene glycol-electrolytes (NULYTELY) 4000 mL solution   No No   Sig: Take 4,000 mL by mouth once for 1 dose      Facility-Administered Medications: None     Discharge Medication List as of 1/27/2025  7:40 PM        START taking these medications    Details   apixaban (Eliquis) 5 mg Multiple Dosages:Starting Mon 1/27/2025, Until Sun 2/2/2025 at 2359, THEN Starting Mon 2/3/2025, Until Tue 2/25/2025 at 2359Take 2 tablets (10 mg total) by mouth 2 (two) times a day for 7 days, THEN 1 tablet (5 mg total) 2 (two) times a day for 23 day s., Normal           CONTINUE these medications which have NOT CHANGED    Details   atorvastatin (LIPITOR) 40 mg tablet Starting Mon 10/5/2020, Historical Med      fluticasone-vilanterol (BREO ELLIPTA) 200-25 MCG/INH inhaler Starting Mon 5/17/2021, Historical Med      hydrochlorothiazide (HYDRODIURIL) 25 mg tablet Take 25 mg by mouth daily, Starting Sat 4/24/2021, Historical Med      losartan (COZAAR) 100 MG tablet Take 100 mg by mouth daily, Starting Sun 2/28/2021, Historical Med      meloxicam (Mobic) 15 mg tablet Take 1 tablet (15 mg total) by mouth daily As needed, Starting  Fri 3/4/2022, Normal      metFORMIN (GLUCOPHAGE) 1000 MG tablet Take 1,000 mg by mouth 2 (two) times a day, Starting Sat 2/13/2021, Historical Med      Multiple Vitamins-Minerals (multivitamin with minerals) tablet Take 1 tablet by mouth daily, Historical Med      !! Ozempic, 0.25 or 0.5 MG/DOSE, 2 MG/1.5ML SOPN USE AS DIRECTED- 0.5MG ONCE WEEKLY, Historical Med      pantoprazole (PROTONIX) 40 mg tablet Take 1 tablet (40 mg total) by mouth daily, Starting Mon 11/11/2024, Normal      polyethylene glycol-electrolytes (NULYTELY) 4000 mL solution Take 4,000 mL by mouth once for 1 dose, Starting Mon 11/11/2024, Normal      !! Semaglutide (OZEMPIC, 0.25 OR 0.5 MG/DOSE, SC) Starting Sat 1/9/2021, Historical Med      Varenicline Tartrate, Starter, 0.5 MG X 11 & 1 MG X 42 TBPK Historical Med      Xarelto 10 MG tablet Starting Fri 6/3/2022, Historical Med       !! - Potential duplicate medications found. Please discuss with provider.        STOP taking these medications       aspirin (Aspirin 81) 81 mg EC tablet Comments:   Reason for Stopping:               ED SEPSIS DOCUMENTATION   Time reflects when diagnosis was documented in both MDM as applicable and the Disposition within this note       Time User Action Codes Description Comment    1/27/2025  7:39 PM Carlos Miranda Add [I80.01] Thrombophlebitis of superficial veins of right lower extremity                  Carlos Miranda PA-C  01/27/25 2005

## 2025-01-28 NOTE — DISCHARGE INSTRUCTIONS
Please return to the emergency department for worsening symptoms including chest pain, shortness of breath, dizziness, lightheadedness, fever greater than 103, severe pain, inability to walk, fainting episodes, etc..  Please follow-up with your family practice provider as soon as possible.  Stop taking aspirin at home as he will now be on a blood thinner.  Blood thinners increase your risk of bleeding.  With any trauma, head strike, motor vehicle collisions, etc., please come to the emergency department for an urgent evaluation for bleeding risk.  Follow-up with a vascular specialist.  I have given you a referral.

## 2025-01-29 ENCOUNTER — APPOINTMENT (OUTPATIENT)
Dept: LAB | Facility: HOSPITAL | Age: 60
End: 2025-01-29
Attending: INTERNAL MEDICINE
Payer: COMMERCIAL

## 2025-01-29 DIAGNOSIS — N52.9 IMPOTENCE OF ORGANIC ORIGIN: ICD-10-CM

## 2025-01-29 DIAGNOSIS — E11.69 TYPE 2 DIABETES MELLITUS WITH OTHER SPECIFIED COMPLICATION, WITHOUT LONG-TERM CURRENT USE OF INSULIN (HCC): ICD-10-CM

## 2025-01-29 DIAGNOSIS — I80.02 SUPERFICIAL PHLEBITIS OF LEFT LEG: ICD-10-CM

## 2025-01-29 LAB
ALBUMIN SERPL BCG-MCNC: 4.5 G/DL (ref 3.5–5)
ALP SERPL-CCNC: 64 U/L (ref 34–104)
ALT SERPL W P-5'-P-CCNC: 56 U/L (ref 7–52)
ANION GAP SERPL CALCULATED.3IONS-SCNC: 10 MMOL/L (ref 4–13)
AST SERPL W P-5'-P-CCNC: 32 U/L (ref 13–39)
BASOPHILS # BLD AUTO: 0.14 THOUSANDS/ΜL (ref 0–0.1)
BASOPHILS NFR BLD AUTO: 1 % (ref 0–1)
BILIRUB SERPL-MCNC: 0.61 MG/DL (ref 0.2–1)
BUN SERPL-MCNC: 14 MG/DL (ref 5–25)
CALCIUM SERPL-MCNC: 9.7 MG/DL (ref 8.4–10.2)
CHLORIDE SERPL-SCNC: 102 MMOL/L (ref 96–108)
CO2 SERPL-SCNC: 27 MMOL/L (ref 21–32)
CREAT SERPL-MCNC: 1.11 MG/DL (ref 0.6–1.3)
EOSINOPHIL # BLD AUTO: 0.4 THOUSAND/ΜL (ref 0–0.61)
EOSINOPHIL NFR BLD AUTO: 4 % (ref 0–6)
ERYTHROCYTE [DISTWIDTH] IN BLOOD BY AUTOMATED COUNT: 12.6 % (ref 11.6–15.1)
EST. AVERAGE GLUCOSE BLD GHB EST-MCNC: 151 MG/DL
GFR SERPL CREATININE-BSD FRML MDRD: 73 ML/MIN/1.73SQ M
GLUCOSE P FAST SERPL-MCNC: 157 MG/DL (ref 65–99)
HBA1C MFR BLD: 6.9 %
HCT VFR BLD AUTO: 49.5 % (ref 36.5–49.3)
HGB BLD-MCNC: 16.2 G/DL (ref 12–17)
IMM GRANULOCYTES # BLD AUTO: 0.07 THOUSAND/UL (ref 0–0.2)
IMM GRANULOCYTES NFR BLD AUTO: 1 % (ref 0–2)
LYMPHOCYTES # BLD AUTO: 2.91 THOUSANDS/ΜL (ref 0.6–4.47)
LYMPHOCYTES NFR BLD AUTO: 28 % (ref 14–44)
MCH RBC QN AUTO: 31.5 PG (ref 26.8–34.3)
MCHC RBC AUTO-ENTMCNC: 32.7 G/DL (ref 31.4–37.4)
MCV RBC AUTO: 96 FL (ref 82–98)
MONOCYTES # BLD AUTO: 0.66 THOUSAND/ΜL (ref 0.17–1.22)
MONOCYTES NFR BLD AUTO: 6 % (ref 4–12)
NEUTROPHILS # BLD AUTO: 6.29 THOUSANDS/ΜL (ref 1.85–7.62)
NEUTS SEG NFR BLD AUTO: 60 % (ref 43–75)
NRBC BLD AUTO-RTO: 0 /100 WBCS
PLATELET # BLD AUTO: 248 THOUSANDS/UL (ref 149–390)
PMV BLD AUTO: 10.4 FL (ref 8.9–12.7)
POTASSIUM SERPL-SCNC: 4.3 MMOL/L (ref 3.5–5.3)
PROLACTIN SERPL-MCNC: 6.87 NG/ML (ref 2.64–13.13)
PROT SERPL-MCNC: 7.3 G/DL (ref 6.4–8.4)
RBC # BLD AUTO: 5.15 MILLION/UL (ref 3.88–5.62)
SODIUM SERPL-SCNC: 139 MMOL/L (ref 135–147)
WBC # BLD AUTO: 10.47 THOUSAND/UL (ref 4.31–10.16)

## 2025-01-29 PROCEDURE — 83036 HEMOGLOBIN GLYCOSYLATED A1C: CPT

## 2025-01-29 PROCEDURE — 85025 COMPLETE CBC W/AUTO DIFF WBC: CPT

## 2025-01-29 PROCEDURE — 80053 COMPREHEN METABOLIC PANEL: CPT

## 2025-01-29 PROCEDURE — 84402 ASSAY OF FREE TESTOSTERONE: CPT

## 2025-01-29 PROCEDURE — 84146 ASSAY OF PROLACTIN: CPT

## 2025-01-29 PROCEDURE — 36415 COLL VENOUS BLD VENIPUNCTURE: CPT

## 2025-01-29 PROCEDURE — 84403 ASSAY OF TOTAL TESTOSTERONE: CPT

## 2025-01-31 LAB
TESTOST FREE SERPL-MCNC: 7.8 PG/ML (ref 7.2–24)
TESTOST SERPL-MCNC: 506 NG/DL (ref 264–916)

## 2025-02-13 ENCOUNTER — HOSPITAL ENCOUNTER (OUTPATIENT)
Dept: CT IMAGING | Facility: HOSPITAL | Age: 60
End: 2025-02-13
Attending: INTERNAL MEDICINE
Payer: COMMERCIAL

## 2025-02-13 DIAGNOSIS — I80.01 PHLEBITIS AND THROMBOPHLEBITIS OF SUPERFICIAL VESSELS OF RIGHT LOWER EXTREMITY: ICD-10-CM

## 2025-02-13 DIAGNOSIS — D35.02 ADRENAL ADENOMA, LEFT: ICD-10-CM

## 2025-02-13 PROCEDURE — 74177 CT ABD & PELVIS W/CONTRAST: CPT

## 2025-02-13 PROCEDURE — 71260 CT THORAX DX C+: CPT

## 2025-02-13 RX ADMIN — IOHEXOL 100 ML: 350 INJECTION, SOLUTION INTRAVENOUS at 15:07

## 2025-02-16 ENCOUNTER — APPOINTMENT (OUTPATIENT)
Dept: LAB | Facility: CLINIC | Age: 60
End: 2025-02-16
Payer: COMMERCIAL

## 2025-02-16 DIAGNOSIS — E11.9 DIABETES MELLITUS WITHOUT COMPLICATION (HCC): ICD-10-CM

## 2025-02-16 LAB
ANION GAP SERPL CALCULATED.3IONS-SCNC: 5 MMOL/L (ref 4–13)
BUN SERPL-MCNC: 13 MG/DL (ref 5–25)
CALCIUM SERPL-MCNC: 9.2 MG/DL (ref 8.4–10.2)
CHLORIDE SERPL-SCNC: 105 MMOL/L (ref 96–108)
CO2 SERPL-SCNC: 28 MMOL/L (ref 21–32)
CREAT SERPL-MCNC: 1.06 MG/DL (ref 0.6–1.3)
GFR SERPL CREATININE-BSD FRML MDRD: 77 ML/MIN/1.73SQ M
GLUCOSE P FAST SERPL-MCNC: 143 MG/DL (ref 65–99)
POTASSIUM SERPL-SCNC: 4.4 MMOL/L (ref 3.5–5.3)
SODIUM SERPL-SCNC: 138 MMOL/L (ref 135–147)

## 2025-02-16 PROCEDURE — 80048 BASIC METABOLIC PNL TOTAL CA: CPT

## 2025-02-16 PROCEDURE — 36415 COLL VENOUS BLD VENIPUNCTURE: CPT

## 2025-02-21 ENCOUNTER — TELEPHONE (OUTPATIENT)
Dept: GASTROENTEROLOGY | Facility: CLINIC | Age: 60
End: 2025-02-21

## 2025-02-21 NOTE — TELEPHONE ENCOUNTER
Called and spoke to pt and pt is not sure how long will be on the Eliquis.  He was given another script for month supply from PCP.  Rescheduled pt's procedure to 6/10/25.      Scheduled date of EGD/colonoscopy (as of today): 6/10/25  Physician performing EGD/colonoscopy: GINETTE  Location of EGD/colonoscopy: Dr Mosley  Desired bowel prep reviewed with patient: Golytely  Instructions reviewed with patient by: josue   Clearances:  will send Eliquis clearance 1 month prior to pt's pcp if pt is still taking  Diabetic - Ozempic 7 day hold.

## 2025-02-21 NOTE — TELEPHONE ENCOUNTER
----- Message -----   From: Lorenzo Mosley MD   Sent: 2/21/2025  12:55 PM EST   To: Lorraine Mccollum LPN; ALANNA Morrison this patient was recently started on Eliquis for looks like DVT.  Please check how long he is going to be on Eliquis.  I think we will have to reschedule his elective EGD colonoscopies, because it this may be too early to stop Eliquis for the procedures.  Maybe we can reschedule him in about 3 months time.  Thank you   ----- Message -----   From: Chloe Thompson LPN   Sent: 2/19/2025   9:25 AM EST   To: Lorenzo Mosley MD     Good morning. Mr Weiss was recently seen in ED on 1/27 for a blood clot to the Rt leg. He was recently prescribed Eliquis and has been taking for the past 2 weeks. He scheduled his procedure prior to this event and is wondering if now would be a good time to proceed since his recent Dx vs. Rescheduling.

## 2025-07-15 ENCOUNTER — APPOINTMENT (OUTPATIENT)
Dept: RADIOLOGY | Facility: AMBULARY SURGERY CENTER | Age: 60
End: 2025-07-15
Attending: ORTHOPAEDIC SURGERY
Payer: COMMERCIAL

## 2025-07-15 VITALS — HEIGHT: 76 IN | BODY MASS INDEX: 38.36 KG/M2 | WEIGHT: 315 LBS

## 2025-07-15 DIAGNOSIS — M17.12 ARTHRITIS OF LEFT KNEE: ICD-10-CM

## 2025-07-15 DIAGNOSIS — M17.12 ARTHRITIS OF LEFT KNEE: Primary | ICD-10-CM

## 2025-07-15 PROCEDURE — 20610 DRAIN/INJ JOINT/BURSA W/O US: CPT

## 2025-07-15 PROCEDURE — 99214 OFFICE O/P EST MOD 30 MIN: CPT | Performed by: ORTHOPAEDIC SURGERY

## 2025-07-15 PROCEDURE — 73562 X-RAY EXAM OF KNEE 3: CPT

## 2025-07-15 RX ORDER — TRIAMCINOLONE ACETONIDE 40 MG/ML
40 INJECTION, SUSPENSION INTRA-ARTICULAR; INTRAMUSCULAR
Status: COMPLETED | OUTPATIENT
Start: 2025-07-15 | End: 2025-07-15

## 2025-07-15 RX ORDER — BUPIVACAINE HYDROCHLORIDE 2.5 MG/ML
2 INJECTION, SOLUTION INFILTRATION; PERINEURAL
Status: COMPLETED | OUTPATIENT
Start: 2025-07-15 | End: 2025-07-15

## 2025-07-15 RX ORDER — LIDOCAINE HYDROCHLORIDE 10 MG/ML
5 INJECTION, SOLUTION INFILTRATION; PERINEURAL
Status: COMPLETED | OUTPATIENT
Start: 2025-07-15 | End: 2025-07-15

## 2025-07-15 RX ADMIN — TRIAMCINOLONE ACETONIDE 40 MG: 40 INJECTION, SUSPENSION INTRA-ARTICULAR; INTRAMUSCULAR at 08:15

## 2025-07-15 RX ADMIN — LIDOCAINE HYDROCHLORIDE 5 ML: 10 INJECTION, SOLUTION INFILTRATION; PERINEURAL at 08:15

## 2025-07-15 RX ADMIN — BUPIVACAINE HYDROCHLORIDE 2 ML: 2.5 INJECTION, SOLUTION INFILTRATION; PERINEURAL at 08:15
